# Patient Record
Sex: FEMALE | Race: WHITE
[De-identification: names, ages, dates, MRNs, and addresses within clinical notes are randomized per-mention and may not be internally consistent; named-entity substitution may affect disease eponyms.]

---

## 2018-04-28 ENCOUNTER — HOSPITAL ENCOUNTER (OUTPATIENT)
Dept: HOSPITAL 62 - OD | Age: 58
End: 2018-04-28
Attending: CLINIC/CENTER
Payer: SELF-PAY

## 2018-04-28 DIAGNOSIS — E03.9: Primary | ICD-10-CM

## 2018-04-28 DIAGNOSIS — B18.2: ICD-10-CM

## 2018-04-28 LAB
ADD MANUAL DIFF: NO
ALBUMIN SERPL-MCNC: 4.8 G/DL (ref 3.5–5)
ALP SERPL-CCNC: 85 U/L (ref 38–126)
ALT SERPL-CCNC: 46 U/L (ref 9–52)
ANION GAP SERPL CALC-SCNC: 16 MMOL/L (ref 5–19)
AST SERPL-CCNC: 67 U/L (ref 14–36)
BASOPHILS # BLD AUTO: 0 10^3/UL (ref 0–0.2)
BASOPHILS NFR BLD AUTO: 0.7 % (ref 0–2)
BILIRUB DIRECT SERPL-MCNC: 0.4 MG/DL (ref 0–0.4)
BILIRUB SERPL-MCNC: 0.8 MG/DL (ref 0.2–1.3)
BUN SERPL-MCNC: 14 MG/DL (ref 7–20)
CALCIUM: 10.3 MG/DL (ref 8.4–10.2)
CHLORIDE SERPL-SCNC: 102 MMOL/L (ref 98–107)
CHOLEST SERPL-MCNC: 221.66 MG/DL (ref 0–200)
CO2 SERPL-SCNC: 25 MMOL/L (ref 22–30)
EOSINOPHIL # BLD AUTO: 0.1 10^3/UL (ref 0–0.6)
EOSINOPHIL NFR BLD AUTO: 1.4 % (ref 0–6)
ERYTHROCYTE [DISTWIDTH] IN BLOOD BY AUTOMATED COUNT: 14 % (ref 11.5–14)
GLUCOSE SERPL-MCNC: 108 MG/DL (ref 75–110)
HCT VFR BLD CALC: 44.4 % (ref 36–47)
HGB BLD-MCNC: 15.2 G/DL (ref 12–15.5)
LDLC SERPL DIRECT ASSAY-MCNC: 133 MG/DL (ref ?–100)
LYMPHOCYTES # BLD AUTO: 2.5 10^3/UL (ref 0.5–4.7)
LYMPHOCYTES NFR BLD AUTO: 36.7 % (ref 13–45)
MCH RBC QN AUTO: 33.8 PG (ref 27–33.4)
MCHC RBC AUTO-ENTMCNC: 34.3 G/DL (ref 32–36)
MCV RBC AUTO: 99 FL (ref 80–97)
MONOCYTES # BLD AUTO: 0.5 10^3/UL (ref 0.1–1.4)
MONOCYTES NFR BLD AUTO: 7.6 % (ref 3–13)
NEUTROPHILS # BLD AUTO: 3.6 10^3/UL (ref 1.7–8.2)
NEUTS SEG NFR BLD AUTO: 53.6 % (ref 42–78)
PLATELET # BLD: 116 10^3/UL (ref 150–450)
POTASSIUM SERPL-SCNC: 4.3 MMOL/L (ref 3.6–5)
PROT SERPL-MCNC: 8.5 G/DL (ref 6.3–8.2)
RBC # BLD AUTO: 4.5 10^6/UL (ref 3.72–5.28)
SODIUM SERPL-SCNC: 143.2 MMOL/L (ref 137–145)
TOTAL CELLS COUNTED % (AUTO): 100 %
TRIGL SERPL-MCNC: 243 MG/DL (ref ?–150)
VLDLC SERPL CALC-MCNC: 48.6 MG/DL (ref 10–31)
WBC # BLD AUTO: 6.7 10^3/UL (ref 4–10.5)

## 2018-04-28 PROCEDURE — 80053 COMPREHEN METABOLIC PANEL: CPT

## 2018-04-28 PROCEDURE — 85025 COMPLETE CBC W/AUTO DIFF WBC: CPT

## 2018-04-28 PROCEDURE — 80061 LIPID PANEL: CPT

## 2018-04-28 PROCEDURE — 84443 ASSAY THYROID STIM HORMONE: CPT

## 2018-04-28 PROCEDURE — 36415 COLL VENOUS BLD VENIPUNCTURE: CPT

## 2019-07-24 ENCOUNTER — HOSPITAL ENCOUNTER (OUTPATIENT)
Dept: HOSPITAL 62 - OD | Age: 59
End: 2019-07-24
Attending: CLINIC/CENTER
Payer: SELF-PAY

## 2019-07-24 DIAGNOSIS — Z51.81: Primary | ICD-10-CM

## 2019-07-24 DIAGNOSIS — Z79.899: ICD-10-CM

## 2019-07-24 DIAGNOSIS — Z72.0: ICD-10-CM

## 2019-07-24 LAB
ALBUMIN SERPL-MCNC: 4.5 G/DL (ref 3.5–5)
ALP SERPL-CCNC: 81 U/L (ref 38–126)
ALT SERPL-CCNC: 86 U/L (ref 9–52)
ANION GAP SERPL CALC-SCNC: 9 MMOL/L (ref 5–19)
AST SERPL-CCNC: 183 U/L (ref 14–36)
BARBITURATES UR QL SCN: NEGATIVE
BILIRUB DIRECT SERPL-MCNC: 0.4 MG/DL (ref 0–0.4)
BILIRUB SERPL-MCNC: 0.6 MG/DL (ref 0.2–1.3)
BUN SERPL-MCNC: 16 MG/DL (ref 7–20)
CALCIUM: 9.8 MG/DL (ref 8.4–10.2)
CHLORIDE SERPL-SCNC: 103 MMOL/L (ref 98–107)
CHOLEST SERPL-MCNC: 214.71 MG/DL (ref 0–200)
CO2 SERPL-SCNC: 29 MMOL/L (ref 22–30)
ERYTHROCYTE [DISTWIDTH] IN BLOOD BY AUTOMATED COUNT: 14.6 % (ref 11.5–14)
GLUCOSE SERPL-MCNC: 91 MG/DL (ref 75–110)
HCT VFR BLD CALC: 39.4 % (ref 36–47)
HGB BLD-MCNC: 13.4 G/DL (ref 12–15.5)
LDLC SERPL DIRECT ASSAY-MCNC: 135 MG/DL (ref ?–100)
MCH RBC QN AUTO: 35.9 PG (ref 27–33.4)
MCHC RBC AUTO-ENTMCNC: 34 G/DL (ref 32–36)
MCV RBC AUTO: 105 FL (ref 80–97)
METHADONE UR QL SCN: (no result)
PCP UR QL SCN: NEGATIVE
PLATELET # BLD: 74 10^3/UL (ref 150–450)
POTASSIUM SERPL-SCNC: 4.6 MMOL/L (ref 3.6–5)
PROT SERPL-MCNC: 8.2 G/DL (ref 6.3–8.2)
RBC # BLD AUTO: 3.74 10^6/UL (ref 3.72–5.28)
TRIGL SERPL-MCNC: 228 MG/DL (ref ?–150)
URINE AMPHETAMINES SCREEN: NEGATIVE
URINE BENZODIAZEPINES SCREEN: NEGATIVE
URINE COCAINE SCREEN: NEGATIVE
URINE MARIJUANA (THC) SCREEN: NEGATIVE
VLDLC SERPL CALC-MCNC: 45.6 MG/DL (ref 10–31)
WBC # BLD AUTO: 3.5 10^3/UL (ref 4–10.5)

## 2019-07-24 PROCEDURE — 85027 COMPLETE CBC AUTOMATED: CPT

## 2019-07-24 PROCEDURE — 80053 COMPREHEN METABOLIC PANEL: CPT

## 2019-07-24 PROCEDURE — 36415 COLL VENOUS BLD VENIPUNCTURE: CPT

## 2019-07-24 PROCEDURE — 80307 DRUG TEST PRSMV CHEM ANLYZR: CPT

## 2019-07-24 PROCEDURE — 84443 ASSAY THYROID STIM HORMONE: CPT

## 2019-07-24 PROCEDURE — 80061 LIPID PANEL: CPT

## 2019-11-10 ENCOUNTER — HOSPITAL ENCOUNTER (INPATIENT)
Dept: HOSPITAL 62 - ER | Age: 59
LOS: 5 days | Discharge: HOME | DRG: 177 | End: 2019-11-15
Attending: STUDENT IN AN ORGANIZED HEALTH CARE EDUCATION/TRAINING PROGRAM | Admitting: STUDENT IN AN ORGANIZED HEALTH CARE EDUCATION/TRAINING PROGRAM
Payer: COMMERCIAL

## 2019-11-10 DIAGNOSIS — Z88.0: ICD-10-CM

## 2019-11-10 DIAGNOSIS — F17.200: ICD-10-CM

## 2019-11-10 DIAGNOSIS — J15.1: Primary | ICD-10-CM

## 2019-11-10 DIAGNOSIS — J44.0: ICD-10-CM

## 2019-11-10 DIAGNOSIS — J96.01: ICD-10-CM

## 2019-11-10 DIAGNOSIS — Z86.19: ICD-10-CM

## 2019-11-10 DIAGNOSIS — E05.00: ICD-10-CM

## 2019-11-10 DIAGNOSIS — I10: ICD-10-CM

## 2019-11-10 DIAGNOSIS — J44.9: ICD-10-CM

## 2019-11-10 LAB
A TYPE INFLUENZA AG: NEGATIVE
ABSOLUTE LYMPHOCYTES# (MANUAL): 1.5 10^3/UL (ref 0.5–4.7)
ABSOLUTE MONOCYTES # (MANUAL): 0.8 10^3/UL (ref 0.1–1.4)
ADD MANUAL DIFF: YES
ALBUMIN SERPL-MCNC: 4.1 G/DL (ref 3.5–5)
ALP SERPL-CCNC: 123 U/L (ref 38–126)
ANION GAP SERPL CALC-SCNC: 15 MMOL/L (ref 5–19)
ANISOCYTOSIS BLD QL SMEAR: SLIGHT
ARTERIAL BLOOD H2CO3: 1.15 MMOL/L (ref 1.05–1.35)
ARTERIAL BLOOD HCO3: 25.6 MMOL/L (ref 20–24)
ARTERIAL BLOOD PCO2: 38.1 MMHG (ref 35–45)
ARTERIAL BLOOD PH: 7.45 (ref 7.35–7.45)
ARTERIAL BLOOD PO2: 39.1 MMHG (ref 80–100)
ARTERIAL BLOOD TOTAL CO2: 26.8 MMOL/L (ref 21–25)
AST SERPL-CCNC: 60 U/L (ref 14–36)
B INFLUENZA AG: NEGATIVE
BASE EXCESS BLDA CALC-SCNC: 1.6 MMOL/L
BASOPHILS NFR BLD MANUAL: 0 % (ref 0–2)
BILIRUB DIRECT SERPL-MCNC: 0.5 MG/DL (ref 0–0.4)
BILIRUB SERPL-MCNC: 1.3 MG/DL (ref 0.2–1.3)
BUN SERPL-MCNC: 9 MG/DL (ref 7–20)
CALCIUM: 8.9 MG/DL (ref 8.4–10.2)
CHLORIDE SERPL-SCNC: 94 MMOL/L (ref 98–107)
CO2 SERPL-SCNC: 26 MMOL/L (ref 22–30)
EOSINOPHIL NFR BLD MANUAL: 0 % (ref 0–6)
ERYTHROCYTE [DISTWIDTH] IN BLOOD BY AUTOMATED COUNT: 15.2 % (ref 11.5–14)
GLUCOSE SERPL-MCNC: 97 MG/DL (ref 75–110)
HCT VFR BLD CALC: 39.7 % (ref 36–47)
HGB BLD-MCNC: 14.4 G/DL (ref 12–15.5)
MACROCYTES BLD QL SMEAR: (no result)
MCH RBC QN AUTO: 40.2 PG (ref 27–33.4)
MCHC RBC AUTO-ENTMCNC: 36.4 G/DL (ref 32–36)
MCV RBC AUTO: 111 FL (ref 80–97)
MONOCYTES % (MANUAL): 8 % (ref 3–13)
NEUTS BAND NFR BLD MANUAL: 2 % (ref 3–5)
NT PRO BNP: 200 PG/ML (ref ?–125)
PLATELET # BLD: 183 10^3/UL (ref 150–450)
PLATELET COMMENT: ADEQUATE
POLYCHROMASIA BLD QL SMEAR: SLIGHT
POTASSIUM SERPL-SCNC: 4.3 MMOL/L (ref 3.6–5)
PROT SERPL-MCNC: 8.6 G/DL (ref 6.3–8.2)
RBC # BLD AUTO: 3.59 10^6/UL (ref 3.72–5.28)
SAO2 % BLDA: 76.3 % (ref 94–98)
SEGMENTED NEUTROPHILS % (MAN): 74 % (ref 42–78)
TOTAL CELLS COUNTED BLD: 100
TOXIC GRANULES BLD QL SMEAR: (no result)
TROPONIN I SERPL-MCNC: < 0.012 NG/ML
VARIANT LYMPHS NFR BLD MANUAL: 16 % (ref 13–45)
WBC # BLD AUTO: 9.4 10^3/UL (ref 4–10.5)
WBC TOXIC VACUOLES BLD QL SMEAR: PRESENT

## 2019-11-10 PROCEDURE — 96365 THER/PROPH/DIAG IV INF INIT: CPT

## 2019-11-10 PROCEDURE — 83880 ASSAY OF NATRIURETIC PEPTIDE: CPT

## 2019-11-10 PROCEDURE — 82803 BLOOD GASES ANY COMBINATION: CPT

## 2019-11-10 PROCEDURE — 87186 SC STD MICRODIL/AGAR DIL: CPT

## 2019-11-10 PROCEDURE — 36415 COLL VENOUS BLD VENIPUNCTURE: CPT

## 2019-11-10 PROCEDURE — 87205 SMEAR GRAM STAIN: CPT

## 2019-11-10 PROCEDURE — 71046 X-RAY EXAM CHEST 2 VIEWS: CPT

## 2019-11-10 PROCEDURE — 36600 WITHDRAWAL OF ARTERIAL BLOOD: CPT

## 2019-11-10 PROCEDURE — 85027 COMPLETE CBC AUTOMATED: CPT

## 2019-11-10 PROCEDURE — 87804 INFLUENZA ASSAY W/OPTIC: CPT

## 2019-11-10 PROCEDURE — 87070 CULTURE OTHR SPECIMN AEROBIC: CPT

## 2019-11-10 PROCEDURE — 93005 ELECTROCARDIOGRAM TRACING: CPT

## 2019-11-10 PROCEDURE — 99285 EMERGENCY DEPT VISIT HI MDM: CPT

## 2019-11-10 PROCEDURE — 94660 CPAP INITIATION&MGMT: CPT

## 2019-11-10 PROCEDURE — 84484 ASSAY OF TROPONIN QUANT: CPT

## 2019-11-10 PROCEDURE — 85025 COMPLETE CBC W/AUTO DIFF WBC: CPT

## 2019-11-10 PROCEDURE — 96368 THER/DIAG CONCURRENT INF: CPT

## 2019-11-10 PROCEDURE — 87040 BLOOD CULTURE FOR BACTERIA: CPT

## 2019-11-10 PROCEDURE — 80053 COMPREHEN METABOLIC PANEL: CPT

## 2019-11-10 PROCEDURE — 94799 UNLISTED PULMONARY SVC/PX: CPT

## 2019-11-10 PROCEDURE — 80048 BASIC METABOLIC PNL TOTAL CA: CPT

## 2019-11-10 PROCEDURE — 94640 AIRWAY INHALATION TREATMENT: CPT

## 2019-11-10 PROCEDURE — 87077 CULTURE AEROBIC IDENTIFY: CPT

## 2019-11-10 PROCEDURE — 93010 ELECTROCARDIOGRAM REPORT: CPT

## 2019-11-10 RX ADMIN — Medication SCH ML: at 22:05

## 2019-11-10 RX ADMIN — IPRATROPIUM BROMIDE AND ALBUTEROL SULFATE SCH ML: 2.5; .5 SOLUTION RESPIRATORY (INHALATION) at 20:02

## 2019-11-10 RX ADMIN — ALBUTEROL SULFATE SCH MG: 2.5 SOLUTION RESPIRATORY (INHALATION) at 16:11

## 2019-11-10 RX ADMIN — HEPARIN SODIUM SCH: 5000 INJECTION, SOLUTION INTRAVENOUS; SUBCUTANEOUS at 22:04

## 2019-11-10 NOTE — RADIOLOGY REPORT (SQ)
EXAM DESCRIPTION:  CHEST 2 VIEWS



COMPLETED DATE/TIME:  11/10/2019 2:49 pm



REASON FOR STUDY:  cough, fever, sob



COMPARISON:  11/9/2016.



EXAM PARAMETERS:  NUMBER OF VIEWS: two views

TECHNIQUE: Digital Frontal and Lateral radiographic views of the chest acquired.

RADIATION DOSE: NA

LIMITATIONS: none



FINDINGS:  LUNGS AND PLEURA: There is evidence of infiltrate at the right lung base obscuring the rig
ht heart border and medial hemidiaphragm.  The findings are compatible with right middle lobe infiltr
ate.  There is suggestion of discoid atelectasis in the right lower lobe.

MEDIASTINUM AND HILAR STRUCTURES: No masses or contour abnormalities.

HEART AND VASCULAR STRUCTURES: Normal heart and pulmonary vasculature.  .

BONES: No acute findings.

HARDWARE: None in the chest.

OTHER: No other significant finding.



IMPRESSION:  Findings consistent with right middle lobe infiltrate/atelectasis.  Right lower lobe dis
coid atelectasis.



TECHNICAL DOCUMENTATION:  JOB ID:  [id number]

[id number]

 2011 [employer]- All Rights Reserved



Reading location - IP/workstation name: [email or web address]

## 2019-11-10 NOTE — ADVANCED CARE
- Diagnosis


(1) Acute respiratory failure with hypoxia


Diagnosis Current: Yes   





(2) COPD (chronic obstructive pulmonary disease)


Diagnosis Current: Yes   





(3) Community acquired pneumonia


Diagnosis Current: Yes   





(4) History of hepatitis C


Diagnosis Current: Yes   





(5) Hypertension


Diagnosis Current: Yes   


Resuscitation Status: Full Code


Discussion: 


Discussed with patient.  She says she is a full code and prefers to receive 

chest compressions, defibrillation or mechanical ventilation if the need arises.

 She says that her daughter, Malini Marquez is her surrogate medical decision 

maker.

## 2019-11-10 NOTE — ER DOCUMENT REPORT
ED Medical Screen (RME)





- General


Chief Complaint: Cough


Stated Complaint: COUGH


Time Seen by Provider: 11/10/19 14:06


Primary Care Provider: 


LAURA NIXON MD [Primary Care Provider] - Follow up as needed


Information source: Patient


Notes: 





Patient presents complaining of cough for the past 10 days.  Patient reports 

fever today with shortness of breath.  Patient also reports intermittent hear

tburn.  Patient denies any nausea or vomiting.  Patient does report a history of

hepatitis C, COPD and Graves' disease.  Oxygen saturation mid 80s on 4 L in 

triage





I have greeted and performed a rapid initial assessment of this patient.  A 

comprehensive ED assessment and evaluation of the patient, analysis of test 

results and completion of the medical decision making process will be conducted 

by additional ED providers.


TRAVEL OUTSIDE OF THE [place] IN LAST 30 DAYS: No





Physical Exam





- Vital signs


Vitals: 





                                        











Temp Pulse Resp BP Pulse Ox


 


 100.2 F   102 H  24 H  143/61 H  84 L


 


 11/10/19 14:05  11/10/19 14:05  11/10/19 14:05  11/10/19 14:05  11/10/19 14:05














- Respiratory


Respiratory status: Tachypnea


Breath sounds: Nonproductive cough, Rhonchi, Wheezing





Course





- Vital Signs


Vital signs: 





                                        











Temp Pulse Resp BP Pulse Ox


 


 100.2 F   102 H  24 H  143/61 H  84 L


 


 11/10/19 14:05  11/10/19 14:05  11/10/19 14:05  11/10/19 14:05  11/10/19 14:05














Doctor's Discharge





- Discharge


Referrals: 


LAURA NIXON MD [Primary Care Provider] - Follow up as needed

## 2019-11-10 NOTE — PDOC H&P
History of Present Illness


Admission Date/PCP: 


  





  LAURA NIXON MD





Patient complains of: cough, fever


History of Present Illness: 


RICARDO KILGORE is a 59 year old female with a past medical history of COPD not

on home O2, hypertension, history of hep C treated with interferon and Graves' 

disease who presented with cough and fever.





Patient says that she has been having increasingly productive cough with milky 

white sputum in the past week and a half.  She says this was associated with 

fever and chills.  She says this is also associated with progressive shortness 

of breath.  She does report that her granddaughter was recently sick and 

diagnosed with pneumonia.  In the ER, she was noted to be hypoxic at 80% on room

air.  Chest x-ray showed right middle lobe pneumonia.  She denies chest pain, 

dizziness or diaphoresis.








Social History


Smoking Status: Current Some Day Smoker





Family History


Parental Family History Reviewed: Yes - No premature CAD


Children Family History Reviewed: No


Sibling(s) Family History Reviewed.: No





Medication/Allergy


Allergies/Adverse Reactions: 


                                        





ampicillin Allergy (Verified 11/10/19 15:17)


   











Review of Systems


All systems: reviewed and no additional remarkable complaints except as stated -

As mentioned in HPI





Physical Exam


Vital Signs: 


                                        











Temp Pulse Resp BP Pulse Ox


 


 100.2 F   102 H  24 H  140/48 H  88 L


 


 11/10/19 14:05  11/10/19 14:05  11/10/19 14:05  11/10/19 15:01  11/10/19 15:01








                                 Intake & Output











 11/09/19 11/10/19 11/11/19





 06:59 06:59 06:59


 


Weight   198 lb 6.656 oz











General appearance: PRESENT: no acute distress, well-developed, well-nourished


Head exam: PRESENT: atraumatic, normocephalic


Eye exam: PRESENT: conjunctiva pink, EOMI, PERRLA.  ABSENT: scleral icterus


Ear exam: PRESENT: normal external ear exam


Mouth exam: PRESENT: moist, tongue midline


Neck exam: ABSENT: carotid bruit, JVD, lymphadenopathy, thyromegaly


Respiratory exam: PRESENT: crackles - Right mid to base, rhonchi.  ABSENT: 

rales, wheezes


Cardiovascular exam: PRESENT: RRR.  ABSENT: diastolic murmur, rubs, systolic 

murmur


Pulses: PRESENT: normal dorsalis pedis pul


GI/Abdominal exam: PRESENT: normal bowel sounds, soft.  ABSENT: distended, 

guarding, mass, organolmegaly, rebound, tenderness


Rectal exam: PRESENT: deferred


Extremities exam: PRESENT: full ROM.  ABSENT: calf tenderness, clubbing, pedal 

edema


Neurological exam: PRESENT: alert, awake, oriented to person, oriented to place,

oriented to time, oriented to situation, CN II-XII grossly intact.  ABSENT: 

motor sensory deficit





Results


Laboratory Results: 


                                        





                                 11/10/19 14:56 





                                 11/10/19 14:56 





                                        











  11/10/19 11/10/19





  14:56 14:56


 


WBC  9.4 


 


RBC  3.59 L 


 


Hgb  14.4 


 


Hct  39.7 


 


MCV  111 H 


 


MCH  40.2 H 


 


MCHC  36.4 H 


 


RDW  15.2 H 


 


Plt Count  183 


 


Seg Neutrophils %  Not Reportable 


 


Sodium   134.5 L


 


Potassium   4.3


 


Chloride   94 L


 


Carbon Dioxide   26


 


Anion Gap   15


 


BUN   9


 


Creatinine   0.71


 


Est GFR (African Amer)   > 60


 


Glucose   97


 


Calcium   8.9


 


Total Bilirubin   1.3


 


AST   60 H


 


Alkaline Phosphatase   123


 


Total Protein   8.6 H


 


Albumin   4.1








                                        











  11/10/19





  14:56


 


Troponin I  < 0.012


 


NT-Pro-B Natriuret Pep  200 H











Impressions: 


                                        





Chest X-Ray  11/10/19 14:11


IMPRESSION:  Findings consistent with right middle lobe infiltrate/atelectasis. 

Right lower lobe discoid atelectasis.


 














Assessment and Plan





- Diagnosis


(1) Acute respiratory failure with hypoxia


Is this a current diagnosis for this admission?: Yes   


Plan: 


Secondary to pneumonia in a patient with COPD.  Saturating at 86% on nasal 

cannula.  Switch patient to nonrebreather.








(2) Community acquired pneumonia


Is this a current diagnosis for this admission?: Yes   


Plan: 


Start patient azithromycin and Rocephin.  Sputum culture ordered.  Scheduled 

breathing treatments.  Will also add steroids.








(3) COPD (chronic obstructive pulmonary disease)


Is this a current diagnosis for this admission?: Yes   


Plan: 


Breathing treatments and steroids.








(4) Hypertension


Is this a current diagnosis for this admission?: Yes   


Plan: 


Resume home meds once verified.








(5) History of hepatitis C


Is this a current diagnosis for this admission?: Yes   


Plan: 


She says she was treated with interferon for only a year.








- Time


Time Spent with patient: 25-34 minutes

## 2019-11-11 PROCEDURE — 5A09457 ASSISTANCE WITH RESPIRATORY VENTILATION, 24-96 CONSECUTIVE HOURS, CONTINUOUS POSITIVE AIRWAY PRESSURE: ICD-10-PCS | Performed by: STUDENT IN AN ORGANIZED HEALTH CARE EDUCATION/TRAINING PROGRAM

## 2019-11-11 RX ADMIN — AZITHROMYCIN SCH MG: 250 TABLET, FILM COATED ORAL at 11:05

## 2019-11-11 RX ADMIN — CIPROFLOXACIN HYDROCHLORIDE AND HYDROCORTISONE SCH DROP: 2; 10 SUSPENSION AURICULAR (OTIC) at 18:01

## 2019-11-11 RX ADMIN — Medication SCH ML: at 22:11

## 2019-11-11 RX ADMIN — HYDROCHLOROTHIAZIDE SCH MG: 12.5 CAPSULE ORAL at 11:11

## 2019-11-11 RX ADMIN — METHADONE HYDROCHLORIDE SCH MG: 10 TABLET ORAL at 20:28

## 2019-11-11 RX ADMIN — OXYCODONE HYDROCHLORIDE PRN MG: 5 TABLET ORAL at 14:05

## 2019-11-11 RX ADMIN — HEPARIN SODIUM SCH: 5000 INJECTION, SOLUTION INTRAVENOUS; SUBCUTANEOUS at 06:11

## 2019-11-11 RX ADMIN — PREDNISONE SCH MG: 20 TABLET ORAL at 11:07

## 2019-11-11 RX ADMIN — IPRATROPIUM BROMIDE AND ALBUTEROL SULFATE SCH ML: 2.5; .5 SOLUTION RESPIRATORY (INHALATION) at 13:29

## 2019-11-11 RX ADMIN — PREDNISONE SCH MG: 20 TABLET ORAL at 18:00

## 2019-11-11 RX ADMIN — IPRATROPIUM BROMIDE AND ALBUTEROL SULFATE SCH ML: 2.5; .5 SOLUTION RESPIRATORY (INHALATION) at 02:25

## 2019-11-11 RX ADMIN — HEPARIN SODIUM SCH: 5000 INJECTION, SOLUTION INTRAVENOUS; SUBCUTANEOUS at 22:12

## 2019-11-11 RX ADMIN — Medication SCH ML: at 14:06

## 2019-11-11 RX ADMIN — IPRATROPIUM BROMIDE AND ALBUTEROL SULFATE SCH ML: 2.5; .5 SOLUTION RESPIRATORY (INHALATION) at 19:50

## 2019-11-11 RX ADMIN — CLONAZEPAM SCH MG: 1 TABLET ORAL at 22:11

## 2019-11-11 RX ADMIN — HEPARIN SODIUM SCH: 5000 INJECTION, SOLUTION INTRAVENOUS; SUBCUTANEOUS at 14:06

## 2019-11-11 RX ADMIN — Medication SCH ML: at 06:13

## 2019-11-11 RX ADMIN — IPRATROPIUM BROMIDE AND ALBUTEROL SULFATE SCH ML: 2.5; .5 SOLUTION RESPIRATORY (INHALATION) at 08:18

## 2019-11-11 RX ADMIN — HYDROCHLOROTHIAZIDE SCH: 12.5 CAPSULE ORAL at 11:04

## 2019-11-11 RX ADMIN — METHADONE HYDROCHLORIDE SCH MG: 10 TABLET ORAL at 11:07

## 2019-11-11 RX ADMIN — CEFTRIAXONE SCH MLS/HR: 1 INJECTION, SOLUTION INTRAVENOUS at 18:12

## 2019-11-11 RX ADMIN — OXYCODONE AND ACETAMINOPHEN PRN TAB: 5; 325 TABLET ORAL at 14:01

## 2019-11-11 RX ADMIN — LISINOPRIL SCH MG: 10 TABLET ORAL at 11:07

## 2019-11-11 NOTE — PDOC PROGRESS REPORT
Subjective


Progress Note for:: 11/11/19


Subjective:: 


RICARDO KILGORE is a 59 year old female with a past medical history of COPD not

on home O2, hypertension, history of hep C treated with interferon and Graves' 

disease who presented with cough and fever. 


In the ER, she was noted to be hypoxic at 80% on room air.  Chest x-ray showed 

right middle lobe pneumonia. 





Patient required BiPAP last night as she desaturated to the 80s even on 5 L of 

nasal cannula.  This morning, she appears more comfortable and is saturating at 

90 to 70% on nasal cannula.  She says her shortness of breath has slightly 

improved from yesterday.  Denies chest pain. 


Reason For Visit: 


ACUTE HYPOXIA RESPIRATORY FAILURE  PNEUMONIA








Physical Exam


Vital Signs: 


                                        











Temp Pulse Resp BP Pulse Ox


 


 97.3 F   81   19   127/64 H  93 


 


 11/11/19 08:28  11/11/19 08:28  11/11/19 08:28  11/11/19 08:28  11/11/19 08:28








                                 Intake & Output











 11/10/19 11/11/19 11/12/19





 06:59 06:59 06:59


 


Intake Total  260 


 


Output Total  200 


 


Balance  60 


 


Weight  197 lb 1.492 oz 











General appearance: PRESENT: no acute distress, well-developed, well-nourished


Head exam: PRESENT: atraumatic, normocephalic


Eye exam: PRESENT: conjunctiva pink, EOMI, PERRLA.  ABSENT: scleral icterus


Ear exam: PRESENT: normal external ear exam


Mouth exam: PRESENT: moist, tongue midline


Neck exam: ABSENT: carotid bruit, JVD, lymphadenopathy, thyromegaly


Respiratory exam: PRESENT: rales, rhonchi.  ABSENT: wheezes


Cardiovascular exam: PRESENT: RRR.  ABSENT: diastolic murmur, rubs, systolic 

murmur


Pulses: PRESENT: normal dorsalis pedis pul


GI/Abdominal exam: PRESENT: normal bowel sounds, soft.  ABSENT: distended, 

guarding, mass, organolmegaly, rebound, tenderness


Rectal exam: PRESENT: deferred


Extremities exam: PRESENT: full ROM.  ABSENT: calf tenderness, clubbing, pedal 

edema


Neurological exam: PRESENT: alert, awake, oriented to person, oriented to place,

oriented to time, oriented to situation, CN II-XII grossly intact.  ABSENT: 

motor sensory deficit





Results


Laboratory Results: 


                                        





                                 11/10/19 14:56 





                                 11/10/19 14:56 





                                        











  11/10/19 11/10/19 11/10/19





  14:56 14:56 21:55


 


WBC  9.4  


 


RBC  3.59 L  


 


Hgb  14.4  


 


Hct  39.7  


 


MCV  111 H  


 


MCH  40.2 H  


 


MCHC  36.4 H  


 


RDW  15.2 H  


 


Plt Count  183  


 


Seg Neutrophils %  Not Reportable  


 


Carbonic Acid    1.15


 


HCO3/H2CO3 Ratio    22:1


 


ABG pH    7.45


 


ABG pCO2    38.1


 


ABG pO2    39.1 L*


 


ABG HCO3    25.6 H


 


ABG O2 Saturation    76.3 L


 


ABG Base Excess    1.6


 


FiO2    36%


 


Sodium   134.5 L 


 


Potassium   4.3 


 


Chloride   94 L 


 


Carbon Dioxide   26 


 


Anion Gap   15 


 


BUN   9 


 


Creatinine   0.71 


 


Est GFR ( Amer)   > 60 


 


Glucose   97 


 


Calcium   8.9 


 


Total Bilirubin   1.3 


 


AST   60 H 


 


Alkaline Phosphatase   123 


 


Total Protein   8.6 H 


 


Albumin   4.1 








                                        











  11/10/19





  14:56


 


Troponin I  < 0.012


 


NT-Pro-B Natriuret Pep  200 H











Impressions: 


                                        





Chest X-Ray  11/10/19 14:11


IMPRESSION:  Findings consistent with right middle lobe infiltrate/atelectasis. 

Right lower lobe discoid atelectasis.


 














Assessment and Plan





- Diagnosis


(1) Acute respiratory failure with hypoxia


Is this a current diagnosis for this admission?: Yes   


Plan: 


Secondary to pneumonia in a patient with COPD.  





Off BIPAP this morning.








(2) Community acquired pneumonia


Is this a current diagnosis for this admission?: Yes   


Plan: 


Continue antibiotics and scheduled breathing treatments.  








(3) COPD (chronic obstructive pulmonary disease)


Is this a current diagnosis for this admission?: Yes   


Plan: 


Switch solumedrol to prednisone.








(4) Hypertension


Is this a current diagnosis for this admission?: Yes   


Plan: 


Resume home meds.








(5) History of hepatitis C


Is this a current diagnosis for this admission?: Yes   


Plan: 


She says she was treated with interferon for only a year.








- Time


Time Spent with patient: 25-34 minutes

## 2019-11-12 LAB — PATH REV BLD -IMP: (no result)

## 2019-11-12 RX ADMIN — LISINOPRIL SCH MG: 10 TABLET ORAL at 10:15

## 2019-11-12 RX ADMIN — OXYCODONE HYDROCHLORIDE PRN MG: 5 TABLET ORAL at 14:45

## 2019-11-12 RX ADMIN — LEVOTHYROXINE SODIUM SCH MG: 100 TABLET ORAL at 06:43

## 2019-11-12 RX ADMIN — AZITHROMYCIN SCH MG: 250 TABLET, FILM COATED ORAL at 10:15

## 2019-11-12 RX ADMIN — PREDNISONE SCH MG: 20 TABLET ORAL at 18:16

## 2019-11-12 RX ADMIN — HEPARIN SODIUM SCH: 5000 INJECTION, SOLUTION INTRAVENOUS; SUBCUTANEOUS at 22:17

## 2019-11-12 RX ADMIN — IPRATROPIUM BROMIDE AND ALBUTEROL SULFATE SCH ML: 2.5; .5 SOLUTION RESPIRATORY (INHALATION) at 08:44

## 2019-11-12 RX ADMIN — HEPARIN SODIUM SCH: 5000 INJECTION, SOLUTION INTRAVENOUS; SUBCUTANEOUS at 14:00

## 2019-11-12 RX ADMIN — METHADONE HYDROCHLORIDE SCH MG: 10 TABLET ORAL at 18:16

## 2019-11-12 RX ADMIN — CIPROFLOXACIN HYDROCHLORIDE AND HYDROCORTISONE SCH DROP: 2; 10 SUSPENSION AURICULAR (OTIC) at 18:18

## 2019-11-12 RX ADMIN — Medication SCH ML: at 06:44

## 2019-11-12 RX ADMIN — HEPARIN SODIUM SCH: 5000 INJECTION, SOLUTION INTRAVENOUS; SUBCUTANEOUS at 05:55

## 2019-11-12 RX ADMIN — DOCUSATE SODIUM PRN MG: 100 CAPSULE, LIQUID FILLED ORAL at 14:44

## 2019-11-12 RX ADMIN — CIPROFLOXACIN HYDROCHLORIDE AND HYDROCORTISONE SCH DROP: 2; 10 SUSPENSION AURICULAR (OTIC) at 10:16

## 2019-11-12 RX ADMIN — OXYCODONE AND ACETAMINOPHEN PRN TAB: 5; 325 TABLET ORAL at 14:44

## 2019-11-12 RX ADMIN — ALUMINUM HYDROXIDE, MAGNESIUM HYDROXIDE, AND SIMETHICONE PRN ML: 200; 200; 20 SUSPENSION ORAL at 11:16

## 2019-11-12 RX ADMIN — HYDROCHLOROTHIAZIDE SCH MG: 12.5 CAPSULE ORAL at 10:14

## 2019-11-12 RX ADMIN — METHADONE HYDROCHLORIDE SCH MG: 10 TABLET ORAL at 06:44

## 2019-11-12 RX ADMIN — CEFTRIAXONE SCH MLS/HR: 1 INJECTION, SOLUTION INTRAVENOUS at 18:17

## 2019-11-12 RX ADMIN — IPRATROPIUM BROMIDE AND ALBUTEROL SULFATE SCH ML: 2.5; .5 SOLUTION RESPIRATORY (INHALATION) at 02:57

## 2019-11-12 RX ADMIN — IPRATROPIUM BROMIDE AND ALBUTEROL SULFATE SCH ML: 2.5; .5 SOLUTION RESPIRATORY (INHALATION) at 19:57

## 2019-11-12 RX ADMIN — Medication SCH ML: at 22:22

## 2019-11-12 RX ADMIN — CLONAZEPAM SCH MG: 1 TABLET ORAL at 10:15

## 2019-11-12 RX ADMIN — CLONAZEPAM SCH MG: 1 TABLET ORAL at 22:22

## 2019-11-12 RX ADMIN — PREDNISONE SCH MG: 20 TABLET ORAL at 10:14

## 2019-11-12 RX ADMIN — IPRATROPIUM BROMIDE AND ALBUTEROL SULFATE SCH ML: 2.5; .5 SOLUTION RESPIRATORY (INHALATION) at 13:41

## 2019-11-12 RX ADMIN — Medication SCH ML: at 14:00

## 2019-11-12 NOTE — ER DOCUMENT REPORT
Entered by MARTHA EVANS SCRIBE  11/10/19 1520 





Acting as scribe for:CRISTOPHER HARTMANN MD





ED Respiratory Problem





- General


Chief Complaint: Painful Cough


Stated Complaint: COUGH


Time Seen by Provider: 11/10/19 14:06


Primary Care Provider: 


LAURA NIXON MD [Primary Care Provider] - Follow up as needed


Mode of Arrival: Ambulatory


Notes: 





59-year-old female who presents to the emergency department today with 

complaints of shortness of breath with an associated cough.  Patient states she 

was recently diagnosed with COPD and she is not on any medications for this.  

Patient states she is not on home oxygen.  Patient states she has had chills and

fevers over the last week and a half.  Patient states that she is bringing up a 

milky white thick sputum with her cough.  Patient denies any MI history or CVA 

history.


TRAVEL OUTSIDE OF THE [place] IN LAST 30 DAYS: No





- Related Data


Allergies/Adverse Reactions: 


                                        





ampicillin Allergy (Verified 11/10/19 15:17)


   








Home Medications: Thyroxidine





Past Medical History





- General


Information source: Patient





- Social History


Smoking Status: Current Every Day Smoker


Cigarette use (# per day): Yes


Family History: Reviewed & Not Pertinent


Patient has suicidal ideation: No


Patient has homicidal ideation: No


Pulmonary Medical History: Reports: Hx COPD





Review of Systems





- Review of Systems


Constitutional: See HPI, Chills, Fever


EENT: No symptoms reported


Cardiovascular: No symptoms reported


Respiratory: See HPI, Short of breath


Gastrointestinal: No symptoms reported


Genitourinary: No symptoms reported


Female Genitourinary: No symptoms reported


Musculoskeletal: No symptoms reported


Skin: No symptoms reported


Hematologic/Lymphatic: No symptoms reported


Neurological/Psychological: No symptoms reported


-: Yes All other systems reviewed and negative





Physical Exam





- Vital signs


Vitals: 


                                        











Temp Pulse Resp BP Pulse Ox


 


 100.2 F   102 H  24 H  143/61 H  84 L


 


 11/10/19 14:05  11/10/19 14:05  11/10/19 14:05  11/10/19 14:05  11/10/19 14:05














- Notes


Notes: 





Physical Exam:


 


General: Alert, appears short of breath. 


 


HEENT: Normocephalic. Atraumatic. PERRL. Extraocular movements intact. 

Oropharynx clear.


 


Neck: Supple. Non-tender.


 


Respiratory: Mild respiratory distress. Coarse breath sounds bilaterally 

saturating in the upper 80s on 2L nasal cannula. Adequate air movement.


 


Cardiovascular: Mildly tachycardic. Regular rhythm.


 


Abdominal: Normal Inspection. Non-tender. No distension. Normal Bowel Sounds. 


 


Back: No gross abnormalities. 


 


Extremities: Moves all four extremities.


Upper extremities: Normal inspection. Normal ROM.  


Lower extremities: Normal inspection. No edema. Normal ROM.


 


Neurological: Normal cognition. AAOx4. Normal speech.  


 


Psychological: Normal affect. Normal Mood. 


 


Skin: Warm. Dry. Normal color.





Course





- Re-evaluation


Re-evalutation: 





11/10/19 15:58


Patient will be admitted for community-acquired pneumonia being oxygen dependent

no oxygen at home





- Vital Signs


Vital signs: 


                                        











Temp Pulse Resp BP Pulse Ox


 


 100.2 F   102 H  17   120/103 H  95 


 


 11/10/19 14:05  11/10/19 14:05  11/10/19 16:30  11/10/19 16:30  11/10/19 16:30














- Laboratory


Result Diagrams: 


                                 11/10/19 14:56





                                 11/10/19 14:56


Laboratory results interpreted by me: 


                                        











  11/10/19 11/10/19 11/10/19





  14:56 14:56 14:56


 


RBC  3.59 L  


 


MCV  111 H  


 


MCH  40.2 H  


 


MCHC  36.4 H  


 


RDW  15.2 H  


 


Band Neutrophils %  2 L  


 


Sodium   134.5 L 


 


Chloride   94 L 


 


Direct Bilirubin   0.5 H 


 


AST   60 H 


 


NT-Pro-B Natriuret Pep    200 H


 


Total Protein   8.6 H 














Discharge





- Discharge


Clinical Impression: 


Community acquired pneumonia


Qualifiers:


 Laterality: unspecified laterality Qualified Code(s): J18.9 - Pneumonia, 

unspecified organism





COPD (chronic obstructive pulmonary disease)


Qualifiers:


 COPD type: COPD with acute lower respiratory infection Qualified Code(s): J44.0

- Chronic obstructive pulmonary disease with (acute) lower respiratory infection





Condition: Fair


Disposition: ADMITTED AS INPATIENT


Admitting Provider: Diya (Hospitalist)


Unit Admitted: Telemetry


Referrals: 


LAURA NIXON MD [Primary Care Provider] - Follow up as needed





I personally performed the services described in the documentation, reviewed and

edited the documentation which was dictated to the scribe in my presence, and it

accurately records my words and actions.

## 2019-11-12 NOTE — PDOC PROGRESS REPORT
Subjective


Progress Note for:: 11/12/19


Subjective:: 





11/12/2019-heartburn


Reason For Visit: 


ACUTE HYPOXIA RESPIRATORY FAILURE  PNEUMONIA








Physical Exam


Vital Signs: 


                                        











Temp Pulse Resp BP Pulse Ox


 


 97.3 F   75   16   116/66   90 L


 


 11/12/19 07:22  11/12/19 08:44  11/12/19 08:44  11/12/19 07:22  11/12/19 08:44








                                 Intake & Output











 11/11/19 11/12/19 11/13/19





 06:59 06:59 06:59


 


Intake Total 260 1514 


 


Output Total 200  


 


Balance 60 1514 


 


Weight 89.4 kg 90.7 kg 











General appearance: PRESENT: no acute distress, well-developed, well-nourished


Neck exam: ABSENT: carotid bruit, JVD, lymphadenopathy, thyromegaly


Respiratory exam: PRESENT: clear to auscultation beverly.  ABSENT: rales, rhonchi, 

wheezes


Cardiovascular exam: PRESENT: RRR.  ABSENT: diastolic murmur, rubs, systolic 

murmur


Pulses: PRESENT: +1 pedal pulses bilateral


Vascular exam: PRESENT: normal capillary refill


GI/Abdominal exam: PRESENT: normal bowel sounds, soft.  ABSENT: distended, 

guarding, mass, organolmegaly, rebound, tenderness


Extremities exam: PRESENT: full ROM.  ABSENT: calf tenderness, clubbing, pedal 

edema


Neurological exam: PRESENT: alert, awake, oriented to person, oriented to place,

oriented to time, oriented to situation, CN II-XII grossly intact.  ABSENT: 

motor sensory deficit


Psychiatric exam: PRESENT: appropriate affect, normal mood.  ABSENT: homicidal 

ideation, suicidal ideation


Skin exam: PRESENT: dry, intact, warm.  ABSENT: cyanosis, rash





Results


Laboratory Results: 


                                        





                                 11/10/19 14:56 





                                 11/10/19 14:56 





                                        











  11/10/19





  14:56


 


Troponin I  < 0.012


 


NT-Pro-B Natriuret Pep  200 H











Impressions: 


                                        





Chest X-Ray  11/10/19 14:11


IMPRESSION:  Findings consistent with right middle lobe infiltrate/atelectasis. 

Right lower lobe discoid atelectasis.


 














Assessment and Plan





- Diagnosis


(1) Acute respiratory failure with hypoxia


Is this a current diagnosis for this admission?: Yes   


Plan: 


Secondary to pneumonia in a patient with COPD.  





Off BIPAP this morning.





11/12/2019-off BiPAP at this time.  Nasal cannula no acute distress.  Talking in

full sentences.  Continue BiPAP at night








(2) Community acquired pneumonia


Is this a current diagnosis for this admission?: Yes   


Plan: 


Continue antibiotics and scheduled breathing treatments.  





11/12/2019-continue current therapy await cultures








(3) COPD (chronic obstructive pulmonary disease)


Is this a current diagnosis for this admission?: Yes   


Plan: 


Switch solumedrol to prednisone.





11/12/2018-seem to be acute on chronic.  Continue bronchodilators and steroids.








(4) Hypertension


Is this a current diagnosis for this admission?: Yes   


Plan: 


Resume home meds.





11/12/2019-stable








(5) History of hepatitis C


Is this a current diagnosis for this admission?: Yes   


Plan: 


She says she was treated with interferon for only a year.





11/12/2019-stable continue to follow








- Time


Time Spent with patient: 15-24 minutes





- Inpatient Certification


Based on my medical assessment, after consideration of the patient's 

comorbidities, presenting symptoms, or acuity I expect that the services needed 

warrant INPATIENT care.: Yes


I certify that my determination is in accordance with my understanding of 

Medicare's requirements for reasonable and necessary INPATIENT services [42 CFR 

412.3e].: Yes


Medical Necessity: Need for IV Antibiotics

## 2019-11-13 LAB
ANION GAP SERPL CALC-SCNC: 13 MMOL/L (ref 5–19)
BUN SERPL-MCNC: 19 MG/DL (ref 7–20)
CALCIUM: 9.8 MG/DL (ref 8.4–10.2)
CHLORIDE SERPL-SCNC: 102 MMOL/L (ref 98–107)
CO2 SERPL-SCNC: 25 MMOL/L (ref 22–30)
ERYTHROCYTE [DISTWIDTH] IN BLOOD BY AUTOMATED COUNT: 15.5 % (ref 11.5–14)
GLUCOSE SERPL-MCNC: 130 MG/DL (ref 75–110)
HCT VFR BLD CALC: 38.4 % (ref 36–47)
HGB BLD-MCNC: 13 G/DL (ref 12–15.5)
MCH RBC QN AUTO: 36.1 PG (ref 27–33.4)
MCHC RBC AUTO-ENTMCNC: 33.8 G/DL (ref 32–36)
MCV RBC AUTO: 107 FL (ref 80–97)
PLATELET # BLD: 152 10^3/UL (ref 150–450)
POTASSIUM SERPL-SCNC: 5.3 MMOL/L (ref 3.6–5)
RBC # BLD AUTO: 3.59 10^6/UL (ref 3.72–5.28)
WBC # BLD AUTO: 8.2 10^3/UL (ref 4–10.5)

## 2019-11-13 RX ADMIN — HEPARIN SODIUM SCH: 5000 INJECTION, SOLUTION INTRAVENOUS; SUBCUTANEOUS at 06:00

## 2019-11-13 RX ADMIN — CIPROFLOXACIN HYDROCHLORIDE AND HYDROCORTISONE SCH DROP: 2; 10 SUSPENSION AURICULAR (OTIC) at 18:42

## 2019-11-13 RX ADMIN — CLONAZEPAM SCH MG: 1 TABLET ORAL at 09:10

## 2019-11-13 RX ADMIN — IPRATROPIUM BROMIDE AND ALBUTEROL SULFATE SCH ML: 2.5; .5 SOLUTION RESPIRATORY (INHALATION) at 20:28

## 2019-11-13 RX ADMIN — CIPROFLOXACIN HYDROCHLORIDE AND HYDROCORTISONE SCH DROP: 2; 10 SUSPENSION AURICULAR (OTIC) at 09:11

## 2019-11-13 RX ADMIN — PREDNISONE SCH MG: 20 TABLET ORAL at 09:11

## 2019-11-13 RX ADMIN — METHADONE HYDROCHLORIDE SCH MG: 10 TABLET ORAL at 08:34

## 2019-11-13 RX ADMIN — AZITHROMYCIN SCH MG: 250 TABLET, FILM COATED ORAL at 09:09

## 2019-11-13 RX ADMIN — HEPARIN SODIUM SCH: 5000 INJECTION, SOLUTION INTRAVENOUS; SUBCUTANEOUS at 14:13

## 2019-11-13 RX ADMIN — LEVOTHYROXINE SODIUM SCH MG: 100 TABLET ORAL at 06:00

## 2019-11-13 RX ADMIN — CLONAZEPAM SCH MG: 1 TABLET ORAL at 21:27

## 2019-11-13 RX ADMIN — ALUMINUM HYDROXIDE, MAGNESIUM HYDROXIDE, AND SIMETHICONE PRN ML: 200; 200; 20 SUSPENSION ORAL at 19:45

## 2019-11-13 RX ADMIN — PREDNISONE SCH MG: 20 TABLET ORAL at 18:38

## 2019-11-13 RX ADMIN — OXYCODONE AND ACETAMINOPHEN PRN TAB: 5; 325 TABLET ORAL at 14:16

## 2019-11-13 RX ADMIN — IPRATROPIUM BROMIDE AND ALBUTEROL SULFATE SCH ML: 2.5; .5 SOLUTION RESPIRATORY (INHALATION) at 13:39

## 2019-11-13 RX ADMIN — DOCUSATE SODIUM PRN MG: 100 CAPSULE, LIQUID FILLED ORAL at 14:19

## 2019-11-13 RX ADMIN — IPRATROPIUM BROMIDE AND ALBUTEROL SULFATE SCH: 2.5; .5 SOLUTION RESPIRATORY (INHALATION) at 02:32

## 2019-11-13 RX ADMIN — Medication SCH ML: at 14:17

## 2019-11-13 RX ADMIN — GUAIFENESIN SCH MG: 600 TABLET, EXTENDED RELEASE ORAL at 09:09

## 2019-11-13 RX ADMIN — LISINOPRIL SCH MG: 10 TABLET ORAL at 09:11

## 2019-11-13 RX ADMIN — METHADONE HYDROCHLORIDE SCH MG: 10 TABLET ORAL at 18:38

## 2019-11-13 RX ADMIN — Medication SCH ML: at 21:27

## 2019-11-13 RX ADMIN — GUAIFENESIN SCH MG: 600 TABLET, EXTENDED RELEASE ORAL at 21:27

## 2019-11-13 RX ADMIN — HYDROCHLOROTHIAZIDE SCH MG: 12.5 CAPSULE ORAL at 09:09

## 2019-11-13 RX ADMIN — OXYCODONE HYDROCHLORIDE PRN MG: 5 TABLET ORAL at 14:16

## 2019-11-13 RX ADMIN — CEFTRIAXONE SCH MLS/HR: 1 INJECTION, SOLUTION INTRAVENOUS at 18:38

## 2019-11-13 RX ADMIN — Medication SCH ML: at 06:00

## 2019-11-13 RX ADMIN — HEPARIN SODIUM SCH: 5000 INJECTION, SOLUTION INTRAVENOUS; SUBCUTANEOUS at 21:15

## 2019-11-13 RX ADMIN — IPRATROPIUM BROMIDE AND ALBUTEROL SULFATE SCH ML: 2.5; .5 SOLUTION RESPIRATORY (INHALATION) at 08:36

## 2019-11-13 NOTE — PDOC PROGRESS REPORT
Subjective


Progress Note for:: 11/06/19


Subjective:: 





11/12/2019-heartburn





11/13/2019-nonproductive cough


Reason For Visit: 


ACUTE HYPOXIA RESPIRATORY FAILURE  PNEUMONIA








Physical Exam


Vital Signs: 


                                        











Temp Pulse Resp BP Pulse Ox


 


 97.6 F   46 L  16   130/59 H  96 


 


 11/12/19 14:43  11/13/19 07:00  11/12/19 20:00  11/12/19 14:43  11/12/19 20:00








                                 Intake & Output











 11/12/19 11/13/19 11/14/19





 06:59 06:59 06:59


 


Intake Total 1514 1674 


 


Balance 1514 1674 


 


Weight 90.7 kg 90.7 kg 











General appearance: PRESENT: no acute distress, well-developed, well-nourished


Neck exam: ABSENT: carotid bruit, JVD, lymphadenopathy, thyromegaly


Respiratory exam: PRESENT: clear to auscultation beverly, decreased breath sounds, 

symmetrical, unlabored.  ABSENT: rales, rhonchi, wheezes


Cardiovascular exam: PRESENT: RRR.  ABSENT: diastolic murmur, rubs, systolic 

murmur


Pulses: PRESENT: normal dorsalis pedis pul


Vascular exam: PRESENT: normal capillary refill


GI/Abdominal exam: PRESENT: normal bowel sounds


Extremities exam: PRESENT: full ROM.  ABSENT: calf tenderness, clubbing, pedal 

edema


Neurological exam: PRESENT: alert, awake, oriented to person, oriented to place,

oriented to time, oriented to situation, CN II-XII grossly intact.  ABSENT: 

motor sensory deficit


Psychiatric exam: PRESENT: appropriate affect, normal mood.  ABSENT: homicidal 

ideation, suicidal ideation


Skin exam: PRESENT: dry, intact, warm.  ABSENT: cyanosis, rash





Results


Laboratory Results: 


                                        





                                 11/13/19 05:05 





                                        











  11/13/19





  05:05


 


Sodium  140.0


 


Potassium  5.3 H


 


Chloride  102


 


Carbon Dioxide  25


 


Anion Gap  13


 


BUN  19


 


Creatinine  0.68


 


Est GFR (African Amer)  > 60


 


Glucose  130 H


 


Calcium  9.8








                                        











  11/10/19





  14:56


 


Troponin I  < 0.012


 


NT-Pro-B Natriuret Pep  200 H











Impressions: 


                                        





Chest X-Ray  11/10/19 14:11


IMPRESSION:  Findings consistent with right middle lobe infiltrate/atelectasis. 

Right lower lobe discoid atelectasis.


 














Assessment and Plan





- Diagnosis


(1) Acute respiratory failure with hypoxia


Is this a current diagnosis for this admission?: Yes   


Plan: 


Secondary to pneumonia in a patient with COPD.  





Off BIPAP this morning.





11/12/2019-off BiPAP at this time.  Nasal cannula no acute distress.  Talking in

full sentences.  Continue BiPAP at night





11/13/2019-remains off BiPAP.  Talking in full sentences.  We will continue 

BiPAP at night and continue supplemental oxygen








(2) Community acquired pneumonia


Is this a current diagnosis for this admission?: Yes   


Plan: 


Continue antibiotics and scheduled breathing treatments.  





11/12/2019-continue current therapy await cultures





11/13/2019-continue current antibiotic therapy await cultures.








(3) COPD (chronic obstructive pulmonary disease)


Is this a current diagnosis for this admission?: Yes   


Plan: 


Switch solumedrol to prednisone.





11/12/2018-seem to be acute on chronic.  Continue bronchodilators and steroids.





11 13 2019-continue bronchodilators and steroids








(4) Hypertension


Is this a current diagnosis for this admission?: Yes   


Plan: 


Resume home meds.





11/12/2019-stable





11/13/2019-stable at this time








(5) History of hepatitis C


Is this a current diagnosis for this admission?: Yes   


Plan: 


She says she was treated with interferon for only a year.





11/12/2019-stable continue to follow





11/13/2019-stable








- Time


Time Spent with patient: 15-24 minutes





- Inpatient Certification


Based on my medical assessment, after consideration of the patient's 

comorbidities, presenting symptoms, or acuity I expect that the services needed 

warrant INPATIENT care.: Yes


I certify that my determination is in accordance with my understanding of 

Medicare's requirements for reasonable and necessary INPATIENT services [42 CFR 

412.3e].: Yes


Medical Necessity: Need for IV Antibiotics

## 2019-11-14 LAB
ANION GAP SERPL CALC-SCNC: 10 MMOL/L (ref 5–19)
BUN SERPL-MCNC: 18 MG/DL (ref 7–20)
CALCIUM: 9.5 MG/DL (ref 8.4–10.2)
CHLORIDE SERPL-SCNC: 98 MMOL/L (ref 98–107)
CO2 SERPL-SCNC: 30 MMOL/L (ref 22–30)
GLUCOSE SERPL-MCNC: 140 MG/DL (ref 75–110)
POTASSIUM SERPL-SCNC: 5.1 MMOL/L (ref 3.6–5)

## 2019-11-14 RX ADMIN — PREDNISONE SCH MG: 20 TABLET ORAL at 09:26

## 2019-11-14 RX ADMIN — GUAIFENESIN SCH MG: 600 TABLET, EXTENDED RELEASE ORAL at 21:44

## 2019-11-14 RX ADMIN — IPRATROPIUM BROMIDE AND ALBUTEROL SULFATE SCH ML: 2.5; .5 SOLUTION RESPIRATORY (INHALATION) at 19:55

## 2019-11-14 RX ADMIN — METHADONE HYDROCHLORIDE SCH MG: 10 TABLET ORAL at 18:05

## 2019-11-14 RX ADMIN — HYDROCHLOROTHIAZIDE SCH MG: 12.5 CAPSULE ORAL at 09:26

## 2019-11-14 RX ADMIN — CEFTRIAXONE SCH MLS/HR: 1 INJECTION, SOLUTION INTRAVENOUS at 18:04

## 2019-11-14 RX ADMIN — METHADONE HYDROCHLORIDE SCH MG: 10 TABLET ORAL at 06:07

## 2019-11-14 RX ADMIN — Medication SCH: at 21:44

## 2019-11-14 RX ADMIN — PREDNISONE SCH MG: 20 TABLET ORAL at 18:04

## 2019-11-14 RX ADMIN — Medication SCH: at 13:00

## 2019-11-14 RX ADMIN — HEPARIN SODIUM SCH: 5000 INJECTION, SOLUTION INTRAVENOUS; SUBCUTANEOUS at 13:00

## 2019-11-14 RX ADMIN — OXYCODONE HYDROCHLORIDE PRN MG: 5 TABLET ORAL at 15:35

## 2019-11-14 RX ADMIN — GUAIFENESIN SCH MG: 600 TABLET, EXTENDED RELEASE ORAL at 09:26

## 2019-11-14 RX ADMIN — HEPARIN SODIUM SCH: 5000 INJECTION, SOLUTION INTRAVENOUS; SUBCUTANEOUS at 06:05

## 2019-11-14 RX ADMIN — AZITHROMYCIN SCH MG: 250 TABLET, FILM COATED ORAL at 09:26

## 2019-11-14 RX ADMIN — CIPROFLOXACIN HYDROCHLORIDE AND HYDROCORTISONE SCH DROP: 2; 10 SUSPENSION AURICULAR (OTIC) at 09:26

## 2019-11-14 RX ADMIN — IPRATROPIUM BROMIDE AND ALBUTEROL SULFATE SCH ML: 2.5; .5 SOLUTION RESPIRATORY (INHALATION) at 01:57

## 2019-11-14 RX ADMIN — Medication SCH ML: at 06:08

## 2019-11-14 RX ADMIN — CLONAZEPAM SCH MG: 1 TABLET ORAL at 21:44

## 2019-11-14 RX ADMIN — IPRATROPIUM BROMIDE AND ALBUTEROL SULFATE SCH ML: 2.5; .5 SOLUTION RESPIRATORY (INHALATION) at 13:55

## 2019-11-14 RX ADMIN — CLONAZEPAM SCH MG: 1 TABLET ORAL at 09:26

## 2019-11-14 RX ADMIN — CIPROFLOXACIN HYDROCHLORIDE AND HYDROCORTISONE SCH DROP: 2; 10 SUSPENSION AURICULAR (OTIC) at 18:05

## 2019-11-14 RX ADMIN — OXYCODONE AND ACETAMINOPHEN PRN TAB: 5; 325 TABLET ORAL at 15:34

## 2019-11-14 RX ADMIN — LISINOPRIL SCH MG: 10 TABLET ORAL at 09:26

## 2019-11-14 RX ADMIN — IPRATROPIUM BROMIDE AND ALBUTEROL SULFATE SCH ML: 2.5; .5 SOLUTION RESPIRATORY (INHALATION) at 08:43

## 2019-11-14 RX ADMIN — HEPARIN SODIUM SCH: 5000 INJECTION, SOLUTION INTRAVENOUS; SUBCUTANEOUS at 21:44

## 2019-11-14 RX ADMIN — LEVOTHYROXINE SODIUM SCH MG: 100 TABLET ORAL at 06:07

## 2019-11-14 NOTE — PDOC PROGRESS REPORT
Subjective


Progress Note for:: 11/14/19


Subjective:: 





11/12/2019-heartburn





11/13/2019-nonproductive cough





11/14/2019-no complaints this a.m.


Reason For Visit: 


ACUTE HYPOXIA RESPIRATORY FAILURE  PNEUMONIA








Physical Exam


Vital Signs: 


                                        











Temp Pulse Resp BP Pulse Ox


 


 97.9 F   89   16   129/85 H  91 L


 


 11/14/19 08:10  11/14/19 08:43  11/14/19 08:43  11/14/19 08:10  11/14/19 08:43








                                 Intake & Output











 11/13/19 11/14/19 11/15/19





 06:59 06:59 06:59


 


Intake Total 1674 1510 


 


Balance 1674 1510 


 


Weight 90.7 kg 90.2 kg 











General appearance: PRESENT: no acute distress, well-developed, well-nourished


Neck exam: ABSENT: carotid bruit, JVD, lymphadenopathy, thyromegaly


Respiratory exam: PRESENT: clear to auscultation beverly, decreased breath sounds, 

symmetrical, unlabored


Cardiovascular exam: PRESENT: RRR.  ABSENT: diastolic murmur, rubs, systolic 

murmur


Pulses: PRESENT: normal dorsalis pedis pul


Vascular exam: PRESENT: normal capillary refill


GI/Abdominal exam: PRESENT: normal bowel sounds, soft.  ABSENT: distended, 

guarding, mass, organolmegaly, rebound, tenderness


Extremities exam: PRESENT: full ROM.  ABSENT: calf tenderness, clubbing, pedal 

edema


Neurological exam: PRESENT: alert, awake, oriented to person, oriented to place,

oriented to time, oriented to situation, CN II-XII grossly intact.  ABSENT: 

motor sensory deficit


Psychiatric exam: PRESENT: appropriate affect, normal mood.  ABSENT: homicidal 

ideation, suicidal ideation


Skin exam: PRESENT: dry, intact, warm.  ABSENT: cyanosis, rash





Results


Laboratory Results: 


                                        





                                 11/13/19 05:05 





                                 11/14/19 04:02 





                                        











  11/14/19





  04:02


 


Sodium  138.1


 


Potassium  5.1 H


 


Chloride  98


 


Carbon Dioxide  30


 


Anion Gap  10


 


BUN  18


 


Creatinine  0.74


 


Est GFR (African Amer)  > 60


 


Glucose  140 H


 


Calcium  9.5








                                        











  11/10/19





  14:56


 


Troponin I  < 0.012


 


NT-Pro-B Natriuret Pep  200 H











Impressions: 


                                        





Chest X-Ray  11/10/19 14:11


IMPRESSION:  Findings consistent with right middle lobe infiltrate/atelectasis. 

Right lower lobe discoid atelectasis.


 














Assessment and Plan





- Diagnosis


(1) Acute respiratory failure with hypoxia


Is this a current diagnosis for this admission?: Yes   


Plan: 


Secondary to pneumonia in a patient with COPD.  





Off BIPAP this morning.





11/12/2019-off BiPAP at this time.  Nasal cannula no acute distress.  Talking in

full sentences.  Continue BiPAP at night





11/13/2019-remains off BiPAP.  Talking in full sentences.  We will continue 

BiPAP at night and continue supplemental oxygen





11 14,019-remains off BiPAP at this time.  Will start aggressively weaning off 

oxygen and incentive spirometry at this time.








(2) Community acquired pneumonia


Is this a current diagnosis for this admission?: Yes   


Plan: 


Continue antibiotics and scheduled breathing treatments.  





11/12/2019-continue current therapy await cultures





11/13/2019-continue current antibiotic therapy await cultures.





11 14,019-cultures so far negative.  We will continue current wound VAC therapy 

and change to oral antibiotic tomorrow if no change in findings on culture








(3) COPD (chronic obstructive pulmonary disease)


Is this a current diagnosis for this admission?: Yes   


Plan: 


Switch solumedrol to prednisone.





11/12/2018-seem to be acute on chronic.  Continue bronchodilators and steroids.





11 13 2019-continue bronchodilators and steroids





11/14/2019-much improved.  Continue bronchodilators and steroids








(4) Hypertension


Is this a current diagnosis for this admission?: Yes   


Plan: 


Resume home meds.





11/12/2019-stable





11/13/2019-stable at this time





1114 ALT 19-stable at this time continue to follow








(5) History of hepatitis C


Is this a current diagnosis for this admission?: Yes   


Plan: 


She says she was treated with interferon for only a year.





11/12/2019-stable continue to follow





11/13/2019-stable





11 14,2019-stable








- Time


Time Spent with patient: 15-24 minutes





- Inpatient Certification


Based on my medical assessment, after consideration of the patient's 

comorbidities, presenting symptoms, or acuity I expect that the services needed 

warrant INPATIENT care.: Yes


I certify that my determination is in accordance with my understanding of 

Medicare's requirements for reasonable and necessary INPATIENT services [42 CFR 

412.3e].: Yes


Medical Necessity: Need for IV Antibiotics

## 2019-11-15 VITALS — DIASTOLIC BLOOD PRESSURE: 73 MMHG | SYSTOLIC BLOOD PRESSURE: 139 MMHG

## 2019-11-15 LAB
ANION GAP SERPL CALC-SCNC: 12 MMOL/L (ref 5–19)
BUN SERPL-MCNC: 20 MG/DL (ref 7–20)
CALCIUM: 10 MG/DL (ref 8.4–10.2)
CHLORIDE SERPL-SCNC: 98 MMOL/L (ref 98–107)
CO2 SERPL-SCNC: 28 MMOL/L (ref 22–30)
GLUCOSE SERPL-MCNC: 119 MG/DL (ref 75–110)
POTASSIUM SERPL-SCNC: 4.7 MMOL/L (ref 3.6–5)

## 2019-11-15 RX ADMIN — AZITHROMYCIN SCH MG: 250 TABLET, FILM COATED ORAL at 10:45

## 2019-11-15 RX ADMIN — METHADONE HYDROCHLORIDE SCH MG: 10 TABLET ORAL at 06:20

## 2019-11-15 RX ADMIN — HEPARIN SODIUM SCH: 5000 INJECTION, SOLUTION INTRAVENOUS; SUBCUTANEOUS at 06:21

## 2019-11-15 RX ADMIN — GUAIFENESIN SCH MG: 600 TABLET, EXTENDED RELEASE ORAL at 10:47

## 2019-11-15 RX ADMIN — OXYCODONE HYDROCHLORIDE PRN MG: 5 TABLET ORAL at 10:45

## 2019-11-15 RX ADMIN — LEVOTHYROXINE SODIUM SCH MG: 100 TABLET ORAL at 06:20

## 2019-11-15 RX ADMIN — LISINOPRIL SCH MG: 10 TABLET ORAL at 10:46

## 2019-11-15 RX ADMIN — HYDROCHLOROTHIAZIDE SCH MG: 12.5 CAPSULE ORAL at 10:47

## 2019-11-15 RX ADMIN — IPRATROPIUM BROMIDE AND ALBUTEROL SULFATE SCH ML: 2.5; .5 SOLUTION RESPIRATORY (INHALATION) at 09:05

## 2019-11-15 RX ADMIN — CLONAZEPAM SCH MG: 1 TABLET ORAL at 10:46

## 2019-11-15 RX ADMIN — IPRATROPIUM BROMIDE AND ALBUTEROL SULFATE SCH ML: 2.5; .5 SOLUTION RESPIRATORY (INHALATION) at 02:29

## 2019-11-15 RX ADMIN — CIPROFLOXACIN HYDROCHLORIDE AND HYDROCORTISONE SCH DROP: 2; 10 SUSPENSION AURICULAR (OTIC) at 10:48

## 2019-11-15 RX ADMIN — Medication SCH ML: at 06:20

## 2019-11-15 RX ADMIN — OXYCODONE AND ACETAMINOPHEN PRN TAB: 5; 325 TABLET ORAL at 10:45

## 2019-11-15 RX ADMIN — PREDNISONE SCH MG: 20 TABLET ORAL at 10:47

## 2019-11-15 NOTE — PDOC DISCHARGE SUMMARY
Impression





- Admit/DC Date/PCP


Admission Date/Primary Care Provider: 


  11/11/19 16:16





  LAURA NIXON MD





Discharge Date: 11/15/19





- Discharge Diagnosis


(1) Acute respiratory failure with hypoxia


Is this a current diagnosis for this admission?: Yes   





(2) Community acquired pneumonia


Is this a current diagnosis for this admission?: Yes   





(3) COPD (chronic obstructive pulmonary disease)


Is this a current diagnosis for this admission?: Yes   





(4) Hypertension


Is this a current diagnosis for this admission?: Yes   





(5) History of hepatitis C


Is this a current diagnosis for this admission?: Yes   





- Additional Information


Resuscitation Status: Full Code


Discharge Diet: As Tolerated


Discharge Activity: Activity As Tolerated


Referrals: 


LAURA NIXON MD [Primary Care Provider] - 11/26/19 10:30 am


Prescriptions: 


Azithromycin 500 mg PO DAILY #3 tablet


Prednisone 10 mg PO DAILY #21 tablet


Home Medications: 








Clonazepam [Klonopin 1 mg Tablet] 1 mg PO QHS 11/11/19 


Clonazepam [Klonopin 1 mg Tablet] 2 mg PO DAILY 11/11/19 


Levothyroxine Sodium [Synthroid 0.1 mg Tablet] 0.1 mg PO Q6AM 11/11/19 


Lisinopril/Hydrochlorothiazide [Zestoretic 20-12.5 mg Tablet] 1 tab PO DAILY 

11/11/19 


Methadone HCl [Dolophine 10 mg Tablet] 20 mg PO Q12 11/11/19 


Oxycodone HCl/Acetaminophen [Percocet  mg Tablet] 1 tab PO Q12HP PRN 

11/11/19 


Azithromycin 500 mg PO DAILY #3 tablet 11/15/19 


Prednisone 10 mg PO DAILY #21 tablet 11/15/19 











History of Present Illiness


History of Present Illness: 


RICARDO KILGORE is a 59 year old female who presents to the ER with cough and 

fever








Hospital Course


Hospital Course: 


Patient presented to the ER with past medical history of COPD not on home 

oxygen, hypertension and history of C. difficile.  Patient states she had 

increasingly productive cough with milky white sputum over the last week and a 

half prior to admission.  Also states associated with fevers and chills.  The ER

he was hypoxic with O2 sat of 80% on room air.  Chest x-ray showed right middle 

lobe pneumonia.  Patient was subsequently admitted to medical surgical floor 

treated with IV antibiotics.  At this time patient is improved sufficiently 

return home with oral antibiotics and steroids for her COPD.  Patient will 

follow with primary care within 1 week





Physical Exam


Vital Signs: 


                                        











Temp Pulse Resp BP Pulse Ox


 


 98.1 F   83   20   122/57 L  92 


 


 11/14/19 23:44  11/15/19 09:06  11/15/19 09:06  11/14/19 23:44  11/15/19 09:06








                                 Intake & Output











 11/14/19 11/15/19 11/16/19





 06:59 06:59 06:59


 


Intake Total 1510 1752 


 


Balance 1510 1752 


 


Weight 90.2 kg 91.3 kg 











General appearance: PRESENT: no acute distress, well-developed, well-nourished


Neck exam: ABSENT: carotid bruit, JVD, lymphadenopathy, thyromegaly


Respiratory exam: PRESENT: clear to auscultation beverly, decreased breath sounds, 

symmetrical, unlabored.  ABSENT: rales, rhonchi, wheezes


Cardiovascular exam: PRESENT: RRR.  ABSENT: diastolic murmur, rubs, systolic 

murmur


Pulses: PRESENT: normal dorsalis pedis pul


Vascular exam: PRESENT: normal capillary refill


GI/Abdominal exam: PRESENT: normal bowel sounds, soft.  ABSENT: distended, 

guarding, mass, organolmegaly, rebound, tenderness


Extremities exam: PRESENT: full ROM.  ABSENT: calf tenderness, clubbing, pedal 

edema


Neurological exam: PRESENT: alert, awake, oriented to person, oriented to place,

oriented to time, oriented to situation, CN II-XII grossly intact.  ABSENT: 

motor sensory deficit


Psychiatric exam: PRESENT: appropriate affect, normal mood.  ABSENT: homicidal 

ideation, suicidal ideation


Skin exam: PRESENT: dry, intact, warm.  ABSENT: cyanosis, rash





Results


Laboratory Results: 


                                        











WBC  8.2 10^3/uL (4.0-10.5)   11/13/19  05:05    


 


RBC  3.59 10^6/uL (3.72-5.28)  L  11/13/19  05:05    


 


Hgb  13.0 g/dL (12.0-15.5)   11/13/19  05:05    


 


Hct  38.4 % (36.0-47.0)   11/13/19  05:05    


 


MCV  107 fl (80-97)  H D 11/13/19  05:05    


 


MCH  36.1 pg (27.0-33.4)  H  11/13/19  05:05    


 


MCHC  33.8 g/dL (32.0-36.0)   11/13/19  05:05    


 


RDW  15.5 % (11.5-14.0)  H  11/13/19  05:05    


 


Plt Count  152 10^3/uL (150-450)   11/13/19  05:05    


 


Lymph % (Auto)  Not Reportable   11/10/19  14:56    


 


Mono % (Auto)  Not Reportable   11/10/19  14:56    


 


Eos % (Auto)  Not Reportable   11/10/19  14:56    


 


Baso % (Auto)  Not Reportable   11/10/19  14:56    


 


Absolute Neuts (auto)  Not Reportable   11/10/19  14:56    


 


Absolute Lymphs (auto)  Not Reportable   11/10/19  14:56    


 


Absolute Monos (auto)  Not Reportable   11/10/19  14:56    


 


Absolute Eos (auto)  Not Reportable   11/10/19  14:56    


 


Absolute Basos (auto)  Not Reportable   11/10/19  14:56    


 


Total Counted  100   11/10/19  14:56    


 


Seg Neutrophils %  Not Reportable   11/10/19  14:56    


 


Seg Neuts % (Manual)  74 % (42-78)   11/10/19  14:56    


 


Band Neutrophils %  2 % (3-5)  L  11/10/19  14:56    


 


Lymphocytes % (Manual)  16 % (13-45)   11/10/19  14:56    


 


Monocytes % (Manual)  8 % (3-13)   11/10/19  14:56    


 


Eosinophils % (Manual)  0 % (0-6)   11/10/19  14:56    


 


Basophils % (Manual)  0 % (0-2)   11/10/19  14:56    


 


Abs Neuts (Manual)  7.1 10^3/uL (1.7-8.2)   11/10/19  14:56    


 


Abs Lymphs (Manual)  1.5 10^3/uL (0.5-4.7)   11/10/19  14:56    


 


Abs Monocytes (Manual)  0.8 10^3/uL (0.1-1.4)   11/10/19  14:56    


 


Absolute Eos (Manual)  0.0 10^3/uL (0.0-0.6)   11/10/19  14:56    


 


Abs Basophils (Manual)  0.0 10^3/uL (0.0-0.2)   11/10/19  14:56    


 


Toxic Granulation  1+   11/10/19  14:56    


 


Toxic Vacuolation  PRESENT   11/10/19  14:56    


 


Platelet Comment  ADEQUATE   11/10/19  14:56    


 


Polychromasia  SLIGHT   11/10/19  14:56    


 


Anisocytosis  SLIGHT   11/10/19  14:56    


 


Macrocytosis  2+   11/10/19  14:56    


 


Carbonic Acid  1.15 mmol/L (1.05-1.35)   11/10/19  21:55    


 


HCO3/H2CO3 Ratio  22:1   11/10/19  21:55    


 


ABG pH  7.45  (7.35-7.45)   11/10/19  21:55    


 


ABG pCO2  38.1 mmHg (35-45)   11/10/19  21:55    


 


ABG pO2  39.1 mmHg ()  L*  11/10/19  21:55    


 


ABG HCO3  25.6 mmol/L (20-24)  H  11/10/19  21:55    


 


ABG Total CO2  26.8 mmol/L (21-25)  H  11/10/19  21:55    


 


ABG O2 Saturation  76.3 % (94-98)  L  11/10/19  21:55    


 


ABG Base Excess  1.6 mmol/L  11/10/19  21:55    


 


FiO2  36%   11/10/19  21:55    


 


Sodium  138.2 mmol/L (137-145)   11/15/19  05:02    


 


Potassium  4.7 mmol/L (3.6-5.0)   11/15/19  05:02    


 


Chloride  98 mmol/L ()   11/15/19  05:02    


 


Carbon Dioxide  28 mmol/L (22-30)   11/15/19  05:02    


 


Anion Gap  12  (5-19)   11/15/19  05:02    


 


BUN  20 mg/dL (7-20)   11/15/19  05:02    


 


Creatinine  0.71 mg/dL (0.52-1.25)   11/15/19  05:02    


 


Est GFR ( Amer)  > 60  (>60)   11/15/19  05:02    


 


Est GFR (MDRD) Non-Af  > 60  (>60)   11/15/19  05:02    


 


Glucose  119 mg/dL ()  H  11/15/19  05:02    


 


Calcium  10.0 mg/dL (8.4-10.2)   11/15/19  05:02    


 


Total Bilirubin  1.3 mg/dL (0.2-1.3)   11/10/19  14:56    


 


Direct Bilirubin  0.5 mg/dL (0.0-0.4)  H  11/10/19  14:56    


 


Neonat Total Bilirubin  Not Reportable   11/10/19  14:56    


 


Neonat Direct Bilirubin  Not Reportable   11/10/19  14:56    


 


Neonat Indirect Bili  Not Reportable   11/10/19  14:56    


 


AST  60 U/L (14-36)  H  11/10/19  14:56    


 


ALT  28 U/L (<35)   11/10/19  14:56    


 


Alkaline Phosphatase  123 U/L ()   11/10/19  14:56    


 


Troponin I  < 0.012 ng/mL  11/10/19  14:56    


 


NT-Pro-B Natriuret Pep  200 pg/mL (<125)  H  11/10/19  14:56    


 


Total Protein  8.6 g/dL (6.3-8.2)  H  11/10/19  14:56    


 


Albumin  4.1 g/dL (3.5-5.0)   11/10/19  14:56    


 


Influenza A (Rapid)  NEGATIVE  (NEGATIVE)   11/10/19  17:05    


 


Influenza B (Rapid)  NEGATIVE  (NEGATIVE)   11/10/19  17:05    


 


Slides for Path Review  PATHOLOGIST REVIEWED   11/10/19  14:56    








                                        











  11/10/19





  14:56


 


Troponin I  < 0.012


 


NT-Pro-B Natriuret Pep  200 H











Impressions: 


                                        





Chest X-Ray  11/10/19 14:11


IMPRESSION:  Findings consistent with right middle lobe infiltrate/atelectasis. 

Right lower lobe discoid atelectasis.


 














Plan


Time Spent: Greater than 30 Minutes





Stroke


Is this a Stroke Patient?: No





Acute Heart Failure





- **


Is this a Heart Failure Patient?: No

## 2020-06-30 ENCOUNTER — HOSPITAL ENCOUNTER (OUTPATIENT)
Dept: HOSPITAL 62 - OD | Age: 60
End: 2020-06-30
Attending: CLINIC/CENTER
Payer: COMMERCIAL

## 2020-06-30 DIAGNOSIS — Z51.81: ICD-10-CM

## 2020-06-30 DIAGNOSIS — Z79.899: ICD-10-CM

## 2020-06-30 DIAGNOSIS — I10: Primary | ICD-10-CM

## 2020-06-30 DIAGNOSIS — E03.9: ICD-10-CM

## 2020-06-30 DIAGNOSIS — M54.5: ICD-10-CM

## 2020-06-30 DIAGNOSIS — R06.02: ICD-10-CM

## 2020-06-30 DIAGNOSIS — B19.20: ICD-10-CM

## 2020-06-30 DIAGNOSIS — Z87.891: ICD-10-CM

## 2020-06-30 LAB
ALBUMIN SERPL-MCNC: 4.2 G/DL (ref 3.5–5)
ALP SERPL-CCNC: 140 U/L (ref 38–126)
ANION GAP SERPL CALC-SCNC: 7 MMOL/L (ref 5–19)
ARTERIAL BLOOD FIO2: (no result)
ARTERIAL BLOOD H2CO3: 1.13 MMOL/L (ref 1.05–1.35)
ARTERIAL BLOOD HCO3: 27.1 MMOL/L (ref 20–24)
ARTERIAL BLOOD PCO2: 37.6 MMHG (ref 35–45)
ARTERIAL BLOOD PH: 7.48 (ref 7.35–7.45)
ARTERIAL BLOOD PO2: 66.8 MMHG (ref 80–100)
ARTERIAL BLOOD TOTAL CO2: 28.3 MMOL/L (ref 21–25)
AST SERPL-CCNC: 266 U/L (ref 14–36)
BASE EXCESS BLDA CALC-SCNC: 3.6 MMOL/L
BILIRUB DIRECT SERPL-MCNC: 0.4 MG/DL (ref 0–0.4)
BILIRUB SERPL-MCNC: 1.5 MG/DL (ref 0.2–1.3)
BUN SERPL-MCNC: 13 MG/DL (ref 7–20)
CALCIUM: 9.3 MG/DL (ref 8.4–10.2)
CHLORIDE SERPL-SCNC: 98 MMOL/L (ref 98–107)
CO2 SERPL-SCNC: 31 MMOL/L (ref 22–30)
ERYTHROCYTE [DISTWIDTH] IN BLOOD BY AUTOMATED COUNT: 16.6 % (ref 11.5–14)
GLUCOSE SERPL-MCNC: 117 MG/DL (ref 75–110)
HCT VFR BLD CALC: 39.4 % (ref 36–47)
HGB BLD-MCNC: 13.7 G/DL (ref 12–15.5)
MCH RBC QN AUTO: 37.8 PG (ref 27–33.4)
MCHC RBC AUTO-ENTMCNC: 34.8 G/DL (ref 32–36)
MCV RBC AUTO: 109 FL (ref 80–97)
PLATELET # BLD: 66 10^3/UL (ref 150–450)
POTASSIUM SERPL-SCNC: 4.4 MMOL/L (ref 3.6–5)
PROT SERPL-MCNC: 8.5 G/DL (ref 6.3–8.2)
RBC # BLD AUTO: 3.62 10^6/UL (ref 3.72–5.28)
SAO2 % BLDA: 94.5 % (ref 94–98)
WBC # BLD AUTO: 5.5 10^3/UL (ref 4–10.5)

## 2020-06-30 PROCEDURE — 84443 ASSAY THYROID STIM HORMONE: CPT

## 2020-06-30 PROCEDURE — 36415 COLL VENOUS BLD VENIPUNCTURE: CPT

## 2020-06-30 PROCEDURE — 72110 X-RAY EXAM L-2 SPINE 4/>VWS: CPT

## 2020-06-30 PROCEDURE — 85027 COMPLETE CBC AUTOMATED: CPT

## 2020-06-30 PROCEDURE — 93010 ELECTROCARDIOGRAM REPORT: CPT

## 2020-06-30 PROCEDURE — 80053 COMPREHEN METABOLIC PANEL: CPT

## 2020-06-30 PROCEDURE — 82803 BLOOD GASES ANY COMBINATION: CPT

## 2020-06-30 PROCEDURE — 93005 ELECTROCARDIOGRAM TRACING: CPT

## 2020-06-30 PROCEDURE — 71046 X-RAY EXAM CHEST 2 VIEWS: CPT

## 2020-06-30 NOTE — RADIOLOGY REPORT (SQ)
EXAM DESCRIPTION:  LUMBAR SPINE COMPLETE



IMAGES COMPLETED DATE/TIME:  6/30/2020 3:36 pm



REASON FOR STUDY:  SOB;LBP



COMPARISON:  None.



NUMBER OF VIEWS:  Five views including obliques.



TECHNIQUE:  AP, lateral, oblique, and sacral radiographic images acquired of the lumbar spine.



LIMITATIONS:  None.



FINDINGS:  MINERALIZATION: Decreased.

SEGMENTATION: Normal.  No transitional anatomy.

ALIGNMENT: Mild straightening of the normal lumbar lordosis.

VERTEBRAE: Maintained height.  No fracture or worrisome bone lesion.

DISCS: Disc height loss and endplate change greatest at L4-5, L5-S1 and L1-2.

POSTERIOR ELEMENTS: Lower lumbar facet arthropathy greatest at L3-S1.  No definite pars defect.

HARDWARE: None in the spine.

PARASPINAL SOFT TISSUES: Vascular calcifications.  Mild anterior displacement of the calcifications f
rom the lumbar spine may represent aneurysm.  Ultrasound or CT could be considered for further charac
terization.

PELVIS: Intact as visualized. No fractures or worrisome bone lesions. SI joints intact.

OTHER: No other significant finding.



IMPRESSION:  No evidence of acute bony abnormality of the lumbar spine.

Multilevel lumbar facet arthropathy and degenerative change greatest at L4-S1.

Vascular calcifications with mild anterior displacement of the calcifications from the lumbar spine. 
 Findings may be related to patient positioning although aneurysmal dilation or retroperitoneal proce
ss are other considerations.  CT or ultrasound could be considered for further characterization.



TECHNICAL DOCUMENTATION:  JOB ID:  [id number]

 2011 [employer]- All Rights Reserved



Reading location - IP/workstation name: [email or web address]

## 2020-06-30 NOTE — RADIOLOGY REPORT (SQ)
EXAM DESCRIPTION:  CHEST PA/LATERAL



IMAGES COMPLETED DATE/TIME:  6/30/2020 3:36 pm



REASON FOR STUDY:  SOB;LBP



COMPARISON:  11/10/2019



EXAM PARAMETERS:  NUMBER OF VIEWS: two views

TECHNIQUE: Digital Frontal and Lateral radiographic views of the chest acquired.

RADIATION DOSE: NA

LIMITATIONS: none



FINDINGS:  LUNGS AND PLEURA: Improved aeration at the right lung base from prior exam.  Minimal linea
r bibasilar opacities, likely atelectasis.  No pleural effusion or pneumothorax.

MEDIASTINUM AND HILAR STRUCTURES: No masses or contour abnormalities.

HEART AND VASCULAR STRUCTURES: Heart normal size.  No evidence for failure.

BONES: No acute findings.

HARDWARE: None in the chest.

OTHER: No other significant finding.



IMPRESSION:  Improved right basilar aeration from prior.  Minimal linear bibasilar opacities, likely 
atelectasis or scarring.



TECHNICAL DOCUMENTATION:  JOB ID:  [id number]

 2011 [employer]- All Rights Reserved



Reading location - IP/workstation name: [email or web address]

## 2020-07-02 ENCOUNTER — HOSPITAL ENCOUNTER (EMERGENCY)
Dept: HOSPITAL 62 - ER | Age: 60
LOS: 1 days | Discharge: HOME | End: 2020-07-03
Payer: COMMERCIAL

## 2020-07-02 DIAGNOSIS — Z86.19: ICD-10-CM

## 2020-07-02 DIAGNOSIS — K59.00: ICD-10-CM

## 2020-07-02 DIAGNOSIS — R53.1: ICD-10-CM

## 2020-07-02 DIAGNOSIS — J44.9: ICD-10-CM

## 2020-07-02 DIAGNOSIS — F17.200: ICD-10-CM

## 2020-07-02 DIAGNOSIS — R16.1: ICD-10-CM

## 2020-07-02 DIAGNOSIS — K57.90: ICD-10-CM

## 2020-07-02 DIAGNOSIS — G89.29: ICD-10-CM

## 2020-07-02 DIAGNOSIS — T38.1X6A: ICD-10-CM

## 2020-07-02 DIAGNOSIS — R12: ICD-10-CM

## 2020-07-02 DIAGNOSIS — D69.6: ICD-10-CM

## 2020-07-02 DIAGNOSIS — R11.0: ICD-10-CM

## 2020-07-02 DIAGNOSIS — R07.9: ICD-10-CM

## 2020-07-02 DIAGNOSIS — R60.0: ICD-10-CM

## 2020-07-02 DIAGNOSIS — R09.02: ICD-10-CM

## 2020-07-02 DIAGNOSIS — E03.9: ICD-10-CM

## 2020-07-02 DIAGNOSIS — K74.60: ICD-10-CM

## 2020-07-02 DIAGNOSIS — Z91.128: ICD-10-CM

## 2020-07-02 DIAGNOSIS — Z91.14: ICD-10-CM

## 2020-07-02 DIAGNOSIS — M54.5: Primary | ICD-10-CM

## 2020-07-02 DIAGNOSIS — J98.11: ICD-10-CM

## 2020-07-02 DIAGNOSIS — Z88.0: ICD-10-CM

## 2020-07-02 DIAGNOSIS — I10: ICD-10-CM

## 2020-07-02 LAB
ADD MANUAL DIFF: NO
ALBUMIN SERPL-MCNC: 4 G/DL (ref 3.5–5)
ALP SERPL-CCNC: 133 U/L (ref 38–126)
ANION GAP SERPL CALC-SCNC: 8 MMOL/L (ref 5–19)
APPEARANCE UR: CLEAR
APTT PPP: YELLOW S
AST SERPL-CCNC: 234 U/L (ref 14–36)
BASE EXCESS BLDV CALC-SCNC: 3.3 MMOL/L
BASOPHILS # BLD AUTO: 0.1 10^3/UL (ref 0–0.2)
BASOPHILS NFR BLD AUTO: 1.1 % (ref 0–2)
BILIRUB DIRECT SERPL-MCNC: 0.3 MG/DL (ref 0–0.4)
BILIRUB SERPL-MCNC: 1.2 MG/DL (ref 0.2–1.3)
BILIRUB UR QL STRIP: NEGATIVE
BUN SERPL-MCNC: 13 MG/DL (ref 7–20)
CALCIUM: 8.8 MG/DL (ref 8.4–10.2)
CHLORIDE SERPL-SCNC: 100 MMOL/L (ref 98–107)
CO2 SERPL-SCNC: 28 MMOL/L (ref 22–30)
EOSINOPHIL # BLD AUTO: 0.1 10^3/UL (ref 0–0.6)
EOSINOPHIL NFR BLD AUTO: 1.4 % (ref 0–6)
ERYTHROCYTE [DISTWIDTH] IN BLOOD BY AUTOMATED COUNT: 17 % (ref 11.5–14)
FREE T4 (FREE THYROXINE): 0.52 NG/DL (ref 0.78–2.19)
GLUCOSE SERPL-MCNC: 93 MG/DL (ref 75–110)
GLUCOSE UR STRIP-MCNC: NEGATIVE MG/DL
HCO3 BLDV-SCNC: 28.9 MMOL/L (ref 20–32)
HCT VFR BLD CALC: 38.1 % (ref 36–47)
HGB BLD-MCNC: 13.3 G/DL (ref 12–15.5)
INR PPP: 1.22
KETONES UR STRIP-MCNC: NEGATIVE MG/DL
LYMPHOCYTES # BLD AUTO: 1.9 10^3/UL (ref 0.5–4.7)
LYMPHOCYTES NFR BLD AUTO: 32.1 % (ref 13–45)
MCH RBC QN AUTO: 38 PG (ref 27–33.4)
MCHC RBC AUTO-ENTMCNC: 34.8 G/DL (ref 32–36)
MCV RBC AUTO: 109 FL (ref 80–97)
MONOCYTES # BLD AUTO: 0.5 10^3/UL (ref 0.1–1.4)
MONOCYTES NFR BLD AUTO: 8 % (ref 3–13)
NEUTROPHILS # BLD AUTO: 3.3 10^3/UL (ref 1.7–8.2)
NEUTS SEG NFR BLD AUTO: 57.4 % (ref 42–78)
PCO2 BLDV: 48 MMHG (ref 35–63)
PH BLDV: 7.4 [PH] (ref 7.3–7.42)
PH UR STRIP: 7 [PH] (ref 5–9)
PLATELET # BLD: 57 10^3/UL (ref 150–450)
POTASSIUM SERPL-SCNC: 4.1 MMOL/L (ref 3.6–5)
PROT SERPL-MCNC: 8.4 G/DL (ref 6.3–8.2)
PROT UR STRIP-MCNC: NEGATIVE MG/DL
PROTHROMBIN TIME: 15.5 SEC (ref 11.4–15.4)
RBC # BLD AUTO: 3.49 10^6/UL (ref 3.72–5.28)
SP GR UR STRIP: 1.01
T3FREE SERPL-MCNC: 1.84 PG/ML (ref 2.77–5.27)
TOTAL CELLS COUNTED % (AUTO): 100 %
UROBILINOGEN UR-MCNC: NEGATIVE MG/DL (ref ?–2)
WBC # BLD AUTO: 5.8 10^3/UL (ref 4–10.5)

## 2020-07-02 PROCEDURE — 93010 ELECTROCARDIOGRAM REPORT: CPT

## 2020-07-02 PROCEDURE — 83605 ASSAY OF LACTIC ACID: CPT

## 2020-07-02 PROCEDURE — 84443 ASSAY THYROID STIM HORMONE: CPT

## 2020-07-02 PROCEDURE — 36415 COLL VENOUS BLD VENIPUNCTURE: CPT

## 2020-07-02 PROCEDURE — 82803 BLOOD GASES ANY COMBINATION: CPT

## 2020-07-02 PROCEDURE — 85025 COMPLETE CBC W/AUTO DIFF WBC: CPT

## 2020-07-02 PROCEDURE — 81001 URINALYSIS AUTO W/SCOPE: CPT

## 2020-07-02 PROCEDURE — 74174 CTA ABD&PLVS W/CONTRAST: CPT

## 2020-07-02 PROCEDURE — 87040 BLOOD CULTURE FOR BACTERIA: CPT

## 2020-07-02 PROCEDURE — 84484 ASSAY OF TROPONIN QUANT: CPT

## 2020-07-02 PROCEDURE — 93005 ELECTROCARDIOGRAM TRACING: CPT

## 2020-07-02 PROCEDURE — 99284 EMERGENCY DEPT VISIT MOD MDM: CPT

## 2020-07-02 PROCEDURE — 84439 ASSAY OF FREE THYROXINE: CPT

## 2020-07-02 PROCEDURE — 85610 PROTHROMBIN TIME: CPT

## 2020-07-02 PROCEDURE — 71045 X-RAY EXAM CHEST 1 VIEW: CPT

## 2020-07-02 PROCEDURE — 80053 COMPREHEN METABOLIC PANEL: CPT

## 2020-07-02 PROCEDURE — 84481 FREE ASSAY (FT-3): CPT

## 2020-07-02 NOTE — RADIOLOGY REPORT (SQ)
EXAM DESCRIPTION:

X-ray, single view of the chest



CLINICAL HISTORY:

59 years Female, weakness, hypotension



COMPARISON: Two views of the chest 6/30/2020



FINDINGS:

Lungs: Lung volumes are unchanged. There is linear volume loss in

the lung bases bilaterally which is similar to the previous exam.

No focal consolidation. No pneumothorax or pleural effusion.

Mediastinum: Cardiac and mediastinal silhouette are stable

Bones: Osseous structures are normal.



IMPRESSION:

Low lung volumes with linear subsegmental atelectasis in the lung

bases bilaterally. No significant interval change.

## 2020-07-02 NOTE — EKG REPORT
SEVERITY:- ABNORMAL ECG -

SINUS RHYTHM

NONSPECIFIC T ABNORMALITIES, ANT-LAT LEADS

BORDERLINE PROLONGED QT INTERVAL

:

Confirmed by: Yuki Lal MD 02-Jul-2020 22:29:22

## 2020-07-02 NOTE — ER DOCUMENT REPORT
ED General





- General


Chief Complaint: Abnormal Lab Results


Stated Complaint: ABNORMAL LABS, SHORTNESS OF BREATH, COUGH


Time Seen by Provider: 07/02/20 22:03


Primary Care Provider: 


LAURA NIXON MD [Primary Care Provider] - Follow up as needed


TRAVEL OUTSIDE OF THE [place] IN LAST 30 DAYS: No





- HPI


Notes: 





Patient is a 59-year-old female with a history of COPD, Graves' disease, who 

presents to the emergency department for evaluation.  She states she went to her

doctor earlier this week.  She states she is been getting swollen.  She states 

she had swelling in her arms and legs.  She has been having increased back pain.

 She states she has weakness when she tries to stand for prolonged periods.  She

denies any bowel or bladder incontinence, no saddle anesthesia, no focal numbn

ess or weakness.  She is having increased weakness. She was complaining of 

dyspnea.  She presented there to her primary care doctor, and had labs drawn on 

Tuesday.  She called today to find out what her lab results were.  They told her

that she had 20 minutes to come into the office to be seen.  She stated she 

could not get there, so they recommended she go to the emergency department.  

She was not notified of what her abnormal labs were.  The patient has baseline 

shortness of breath, she states perhaps is mildly worse than normal.  Otherwise 

she is had no fevers or chills.  She has had some intermittent nausea but no 

vomiting.  She has had some back pain that makes it impossible to stand for a 

long time.  She denies any numbness or tingling.  She has had some intermittent 

constipation.  No diarrhea.  No urinary symptoms.


On further questioning, the patient admits she does not take her Synthroid all 

of the time.  She states she gets chest pain and heartburn frequently, she 

attributes it to taking this medication.  She states she stopped taking it for 

about 4 weeks, only resumed taking it about 2 weeks ago.





- Related Data


Allergies/Adverse Reactions: 


                                        





ampicillin Allergy (Verified 11/10/19 15:17)


   








Home Medications: Medication list reviewed with patient





Past Medical History





- General


Information source: Patient





- Social History


Smoking Status: Current Some Day Smoker


Family History: Reviewed & Not Pertinent





- Past Medical History


Cardiac Medical History: Reports: Hx Hypertension


Pulmonary Medical History: Reports: Hx COPD


Endocrine Medical History: Reports: Hx Hypothyroidism - Graves' disease


Musculoskeletal Medical History: Reports Hx Arthritis, Reports Other - Chronic 

back pain


Infectious Medical History: Reports: Hx Hepatitis





Review of Systems





- Review of Systems


Constitutional: See HPI


Respiratory: See HPI


Musculoskeletal: See HPI


-: Yes All other systems reviewed and negative





Physical Exam





- Vital signs


Vitals: 


                                        











Temp


 


 99.0 F 


 


 07/02/20 19:01














- Notes


Notes: 





This is a pleasant 59-year-old female who appears her stated age, no acute 

distress.  Vital signs reviewed, please refer to chart. Head is normocephalic, 

atraumatic.  Pupils equal round, reactive to light.  Neck is supple without 

meningismus.  Heart is regular rate and rhythm.  Lungs are clear to auscultation

bilaterally, but diminished breath sounds with expiratory phase.  Abdomen is 

soft, nontender, normoactive bowel sounds throughout.  Extremities without 

cyanosis, clubbing. Posterior calves are nontender.  Peripheral pulses are 

equal.  2+ pitting edema bilateral lower extremity edema.  Skin is warm and dry.

 Patient is awake, alert, neurological exam is nonfocal.





Course





- Re-evaluation


Re-evalutation: 





07/03/20 00:33


Patient presents to the emergency department for evaluation.  She was told she 

has abnormal labs and she should go to the emergency department.  She has vague 

and nonspecific complaints.  She has multiple medical issues.  She is placed on 

a monitor.  Her vital signs are largely unremarkable.  She had labs which 

revealed a high TSH and a low platelet count.  Neither of these are unexpected 

given her chronic medical conditions, as well as her noncompliance with her 

daily Synthroid.  I explained to the patient about these findings.  I explained 

to her that she is supposed to take her Synthroid every day, and if she does not

believe she is tolerating that appropriately, she should discuss with her 

primary provider.  Otherwise, I would not change dosage on Synthroid and the 

patient is only been compliant with it for the last 2 weeks.  Her TSH should be 

checked again in another 4 weeks at least.  Otherwise, she has been 

thrombocytopenic in the past.  I do not see any other emergent condition at this

time.  She is to follow-up with her primary care provider tomorrow, return to 

the emergency department for worsening or new concerning symptoms of any sort.


07/03/20 00:38


I went back and looked 1 more time through all of her recent studies.  At that 

time I did note a lumbar spine film, which the patient had not mentioned at the 

onset of her visit.  It showed findings concerning for possible aneurysmal 

dilation of the aorta.  I will order a CTA of the abdomen and pelvis.








07/03/20 02:04


Findings on patient CT scan were explained, including the nodularity of her 

liver, the need for follow-up.  I do not know why she was sent to the emergency 

department.  Her TSH being high can be explained.  Her mild hypoxia is not 

remarkable.  She has a normal chest x-ray.  She is not any respiratory distress.

 She does not have any aneurysm on her CT scan.  I strongly encouraged her to 

follow-up with her primary care provider next week, unfortunately they are 

closed for the holiday starting tomorrow.  She is to return to the ED with 

worsening or new concerning symptoms of any sort.


07/03/20 02:05


Please note the possible bowel wall thickening on the right is secondary to 

portal hypertension clinically.  The patient does not have any signs or symptoms

of colitis.





- Vital Signs


Vital signs: 


                                        











Temp Pulse Resp BP Pulse Ox


 


 99.0 F   84   17   166/71 H  93 


 


 07/02/20 19:56  07/02/20 19:56  07/03/20 02:01  07/03/20 01:22  07/03/20 02:01














- Laboratory


Result Diagrams: 


                                 07/02/20 22:50





                                 07/02/20 22:50


Laboratory results interpreted by me: 


                                        











  07/02/20 07/02/20 07/02/20





  20:18 22:50 22:50


 


RBC   3.49 L 


 


MCV   109 H 


 


MCH   38.0 H 


 


RDW   17.0 H 


 


Plt Count   57 L 


 


PT    15.5 H


 


Sodium   


 


AST   


 


ALT   


 


Alkaline Phosphatase   


 


Total Protein   


 


TSH  44.50 H  


 


Free T4  0.52 L  


 


Free T3 pg/mL  1.84 L  














  07/02/20





  22:50


 


RBC 


 


MCV 


 


MCH 


 


RDW 


 


Plt Count 


 


PT 


 


Sodium  136.4 L


 


AST  234 H


 


ALT  59 H


 


Alkaline Phosphatase  133 H


 


Total Protein  8.4 H


 


TSH 


 


Free T4 


 


Free T3 pg/mL 














- Diagnostic Test


Radiology reviewed: Image reviewed, Reports reviewed


Radiology results interpreted by me: 





07/03/20 00:35





                                        





Chest X-Ray  07/02/20 22:04


IMPRESSION:


Low lung volumes with linear subsegmental atelectasis in the lung


bases bilaterally. No significant interval change.


 











07/03/20 02:04





                                        





Chest X-Ray  07/02/20 22:04


IMPRESSION:


Low lung volumes with linear subsegmental atelectasis in the lung


bases bilaterally. No significant interval change.


 








Abdomen/Pelvis CTA  07/03/20 00:40


IMPRESSION:


1. Changes of cirrhosis with splenomegaly. There are innumerable


small low-density nodules throughout the liver that may all


represent regenerative or dysplastic nodules but would recommend


follow-up liver protocol MRI with and without contrast to better


evaluate in this cirrhotic liver.


2. Possible bowel wall thickening of the right colon may be bowel


related changes of portal hypertension versus a mild colitis, if


this is a colitis would most likely be an infectious colitis.


3. Diverticulosis. 


 














Discharge





- Discharge


Clinical Impression: 


 History of hepatitis C, Hypothyroidism (acquired), Weakness





Low back pain


Qualifiers:


 Chronicity: chronic Sciatica presence: unspecified whether sciatica present 





Condition: Stable


Disposition: HOME, SELF-CARE


Instructions:  Chronic Obstructive Lung Disease ([facility]), Hypothyroidism ([facility])


Additional Instructions: 


Please take all of your medications as they are prescribed.  Your TSH was high 

here today, but you have taken a vacation from your thyroid medication for over 

a month.  Please have your TSH checked again after 4 to 6 weeks, after taking it

daily.  No signs of aneurysm were noted on your CT scan.  Your chest x-ray was 

unremarkable.  There are nodularities noted on your liver, which may be 

consistent with cirrhosis from your hepatitis, but further testing to rule out 

other conditions, such as cancer, should be performed.  Follow-up with your 

primary care provider next week.  If you develop worsening or new concerning 

symptoms of any sort, return immediately to the emergency department for 

reevaluation.


Referrals: 


LAURA NIXON MD [Primary Care Provider] - Follow up as needed

## 2020-07-03 VITALS — SYSTOLIC BLOOD PRESSURE: 150 MMHG | DIASTOLIC BLOOD PRESSURE: 87 MMHG

## 2020-07-03 LAB — TSH SERPL-ACNC: 44.5 UIU/ML (ref 0.47–4.68)

## 2020-07-03 NOTE — RADIOLOGY REPORT ([facility])
CT angiogram abdomen and pelvis with contrast on 7/3/2020 at 1:16

AM 



CLINICAL INDICATION: Weakness, hypotension



TECHNIQUE: Multiple axial images are obtained throughout the

abdomen and pelvis following the administration of IV contrast. 

Computer generated 3D reconstructions/MIPS were performed. This

exam was performed according to our departmental

dose-optimization program, which includes automated exposure

control, adjustment of the mA and/or kV according to patient size

and/or use of iterative reconstruction technique.

Total DLP is 1986.83 mGy*cm.



COMPARISON: None 



FINDINGS: 



ABDOMEN: There is mild bibasilar atelectasis. There is an

irregular contour of the liver consistent with changes of

cirrhosis. There are innumerable tiny low-density lesions

throughout the liver that may all represent regenerative or

dysplastic nodules but in this cirrhotic liver would recommend

follow-up liver protocol MRI with and without contrast to better

exclude HCC. Splenomegaly is noted with the spleen measuring 17.2

cm in greatest portable length. There is fatty infiltration of

the liver. The solid abdominal organs are otherwise unremarkable.

Vascular calcifications are noted. There is no abdominal aortic

aneurysm or dissection. The celiac, SMA and JULIAN are all patent

without significant stenosis. There are single bilateral renal

arteries with calcified plaque at their origins without definite

significant renal artery stenosis. There is no abdominal

adenopathy. There is no free fluid or free air within the

abdomen. There is mild diverticulosis. There is either inadequate

distention of the right colon or mild wall thickening of the

right colon. This may be bowel related changes of portal

hypertension versus possibly a mild right-sided colitis. If this

is a colitis would most likely be an infectious colitis but

please correlate clinically.



Pelvis: No free fluid is noted in the pelvis. There is no pelvic

adenopathy. No iliac or proximal femoral arterial stenosis is

noted. There is diverticulosis. Pelvic portion of the GI tract

including the appendix is otherwise unremarkable. Degenerative

changes are noted in the spine.



IMPRESSION:

1. Changes of cirrhosis with splenomegaly. There are innumerable

small low-density nodules throughout the liver that may all

represent regenerative or dysplastic nodules but would recommend

follow-up liver protocol MRI with and without contrast to better

evaluate in this cirrhotic liver.

2. Possible bowel wall thickening of the right colon may be bowel

related changes of portal hypertension versus a mild colitis, if

this is a colitis would most likely be an infectious colitis.

3. Diverticulosis.